# Patient Record
Sex: FEMALE | Race: WHITE | NOT HISPANIC OR LATINO | Employment: FULL TIME | URBAN - METROPOLITAN AREA
[De-identification: names, ages, dates, MRNs, and addresses within clinical notes are randomized per-mention and may not be internally consistent; named-entity substitution may affect disease eponyms.]

---

## 2017-01-06 DIAGNOSIS — G47.00 INSOMNIA, UNSPECIFIED TYPE: Primary | ICD-10-CM

## 2017-01-06 NOTE — TELEPHONE ENCOUNTER
Temazepam      Last Written Prescription Date:  8/23/16  Last Fill Quantity: 30,   # refills: 3  [place] [facility] [place]  [facility] prescribing provider: 10/26/16  Future Office visit:       Routing refill request to provider for review/approval because:  Drug not on the [facility] or  [employer] refill protocol or controlled substance    Ann Jacobson CMA ([facility])

## 2017-01-10 RX ORDER — TEMAZEPAM 15 MG/1
CAPSULE ORAL
Qty: 30 CAPSULE | Refills: 3 | Status: SHIPPED | OUTPATIENT
Start: 2017-01-10 | End: 2017-06-05

## 2017-01-23 DIAGNOSIS — F33.0 MILD RECURRENT MAJOR DEPRESSION (H): Primary | ICD-10-CM

## 2017-01-23 NOTE — TELEPHONE ENCOUNTER
Fluoxetine 20 MG = Fax request from [facility],  Fluoxetine 40 mg is listed on med list   Last Written Prescription Date: 10/27/2016  Last Fill Quantity: 90, # refills: 3  Last Office Visit with [facility] [place] care provider:  10/26/2016        Last PHQ-9 score on record=   PHQ-9 SCORE 10/26/2016   Total Score -   Total Score MyChart 14 (Moderate depression)   Total Score 17

## 2017-01-24 NOTE — TELEPHONE ENCOUNTER
Routing refill request to provider for review/approval because:  Patient needs to be seen because:  It has been > 6 months since last DEPRESSION follow up.  Please advise plan  PHQ 9 >4  Lianet Sommers RN

## 2017-01-25 RX ORDER — FLUOXETINE 40 MG/1
40 CAPSULE ORAL DAILY
Qty: 90 CAPSULE | Refills: 0 | Status: SHIPPED | OUTPATIENT
Start: 2017-01-25 | End: 2017-04-28

## 2017-04-28 DIAGNOSIS — F33.0 MILD RECURRENT MAJOR DEPRESSION (H): ICD-10-CM

## 2017-04-28 NOTE — TELEPHONE ENCOUNTER
prozac     Last Written Prescription Date: 01/25/17  Last Fill Quantity: 90, # refills: 0  Last Office Visit with [facility] primary care provider:  10/26/16        Last PHQ-9 score on record=   PHQ-9 SCORE 10/26/2016   Total Score -   Total Score MyChart 14 (Moderate depression)   Total Score 17

## 2017-05-01 RX ORDER — FLUOXETINE 40 MG/1
CAPSULE ORAL
Qty: 90 CAPSULE | Refills: 2 | Status: SHIPPED | OUTPATIENT
Start: 2017-05-01 | End: 2017-08-15 | Stop reason: DRUGHIGH

## 2017-05-01 NOTE — TELEPHONE ENCOUNTER
Routing refill request to provider for review/approval because:  Has been > 6 months since provider has last addressed depression.     Marlyn Galdamez, RN, BSN

## 2017-05-08 DIAGNOSIS — F41.9 ANXIETY: ICD-10-CM

## 2017-05-08 RX ORDER — LORAZEPAM 0.5 MG/1
TABLET ORAL
Qty: 20 TABLET | Refills: 3 | OUTPATIENT
Start: 2017-05-08

## 2017-05-08 NOTE — TELEPHONE ENCOUNTER
LORazepam (ATIVAN) 0.5 MG tablet      Last Written Prescription Date:  10/26/16  Last Fill Quantity: 20,   # refills: 3  Last Office Visit with [facility], [facility] or [facility] prescribing provider: 10/26/16 ABBIE  Future Office visit:       Routing refill request to provider for review/approval because:  Drug not on the [facility], [facility] or [facility] refill protocol or controlled substance

## 2017-06-05 DIAGNOSIS — G47.00 INSOMNIA, UNSPECIFIED TYPE: ICD-10-CM

## 2017-06-05 RX ORDER — TEMAZEPAM 15 MG/1
CAPSULE ORAL
Qty: 30 CAPSULE | Refills: 3 | Status: SHIPPED | OUTPATIENT
Start: 2017-06-05 | End: 2017-11-02

## 2017-06-05 NOTE — TELEPHONE ENCOUNTER
temazepam (RESTORIL) 15 MG capsule      Last Written Prescription Date:  1/10/17  Last Fill Quantity: 30,   # refills: 3  Last Office Visit with [facility], [facility] or [facility] prescribing provider: 10/26/16 alis  Future Office visit:       Routing refill request to provider for review/approval because:  Drug not on the [facility], [facility] or [facility] refill protocol or controlled substance

## 2017-06-06 NOTE — TELEPHONE ENCOUNTER
[facility]   Closing encounter  Ammy Diaz RT (R)

## 2017-08-14 NOTE — PROGRESS NOTES
SUBJECTIVE:                                                    Deo Hargrove is a 41 year old female who presents to clinic today for the following health issues:    History of Present Illness   Depression & Anxiety Follow-up:     Depression/Anxiety:  Anxiety only    Status since last visit::  Worsened    Other associated symptoms of anxiety::  None    Significant life event::  No    Current substance use::  None    Depression symptoms::  None  Diet::  Regular (no restrictions)  Frequency of exercise::  2-3 days/week  Duration of exercise::  15-30 minutes  Taking medications regularly::  No  Barriers to taking medications::  Not applicable  Medication side effects::  None  Additional concerns today::  No      Yesterday she had a panic attack. She has not had one in a while. She has noticed her anxiety has increased overall.  She had been doing very well with her fluoxetine for a number of years. 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  She is not interested in starting more medications in fact she would like to take less meds.    Problem list and histories reviewed & adjusted, as indicated.  Additional history: as documented        BP Readings from Last 3 Encounters:   08/15/17 90/66   10/26/16 108/74   05/26/16 130/84    Wt Readings from Last 3 Encounters:   08/15/17 161 lb 3.2 oz (73.1 kg)   10/26/16 159 lb 3.2 oz (72.2 kg)   02/29/16 159 lb 11.2 oz (72.4 kg)                  Labs reviewed in EPIC      ROS:  She has episodes or she feels very tired and fatigued. They typically last about 2 days. Her joints get very sore and it's hard for her to move because of this. Her neurologist thinks it is related to her migraines. She also has trigeminal neuralgia. Her neurologist manages both issues    OBJECTIVE:     BP 90/66  Pulse 74  Temp 97.8  F (36.6  C) (Temporal)  Resp 16  Ht 5'  "3.39\" (1.61 m)  Wt 161 lb 3.2 oz (73.1 kg)  Breastfeeding? No  BMI 28.21 kg/m2  Body mass index is 28.21 kg/(m^2).  GENERAL: healthy, alert and no distress  PSYCH: mentation appears normal, affect normal/bright    Diagnostic Test Results:  none     ASSESSMENT/PLAN:             1. Generalized anxiety disorder  Increased dosage of fluoxetine to max dosage of 60 mg    2. Mild recurrent major depression (H)  Well-controlled on current med    3. Anxiety  As above our hope is that this will improve her anxiety controlled overall. She was still using lorazepam as needed.  Other things out for today are below , Atarax, or switching her fluoxetine. We will continue all of these things if she fails to improve with higher dosage  - FLUoxetine (PROZAC) 20 MG capsule; Take 3 capsules (60 mg) by mouth daily  Dispense: 90 capsule; Refill: 5  - LORazepam (ATIVAN) 0.5 MG tablet; TAKE 1 TABLET BY MOUTH EVERY DAY AS NEEDED  Dispense: 20 tablet; Refill: 3    4. CARDIOVASCULAR SCREENING; LDL GOAL LESS THAN 160    - Lipid panel reflex to direct LDL    5. Screening for diabetes mellitus    - Glucose    Recheck 6 months if doing well phone visit is fine Otherwise recheck 1 month phone visit    Precious Locke PA-C  [facility]  Answers for HPI/ROS submitted by the patient on 8/15/2017   If you checked off any problems, how difficult have these problems made it for you to do your work, take care of things at home, or get along with other people?: Somewhat difficult  PHQ9 TOTAL SCORE: 7  BONNIE 7 TOTAL SCORE: 11Electronically signed by Precious Locke PA-C   "

## 2017-08-15 ENCOUNTER — OFFICE VISIT (OUTPATIENT)
Dept: FAMILY MEDICINE | Facility: OTHER | Age: 42
End: 2017-08-15
Payer: COMMERCIAL

## 2017-08-15 VITALS
HEIGHT: 63 IN | RESPIRATION RATE: 16 BRPM | SYSTOLIC BLOOD PRESSURE: 90 MMHG | TEMPERATURE: 97.8 F | HEART RATE: 74 BPM | BODY MASS INDEX: 28.56 KG/M2 | DIASTOLIC BLOOD PRESSURE: 66 MMHG | WEIGHT: 161.2 LBS

## 2017-08-15 DIAGNOSIS — F33.0 MILD RECURRENT MAJOR DEPRESSION (H): ICD-10-CM

## 2017-08-15 DIAGNOSIS — F41.9 ANXIETY: ICD-10-CM

## 2017-08-15 DIAGNOSIS — F41.1 GENERALIZED ANXIETY DISORDER: ICD-10-CM

## 2017-08-15 DIAGNOSIS — Z13.1 SCREENING FOR DIABETES MELLITUS: ICD-10-CM

## 2017-08-15 DIAGNOSIS — Z13.6 CARDIOVASCULAR SCREENING; LDL GOAL LESS THAN 160: Primary | ICD-10-CM

## 2017-08-15 LAB
CHOLEST SERPL-MCNC: 205 MG/DL
GLUCOSE SERPL-MCNC: 90 MG/DL (ref 70–99)
HDLC SERPL-MCNC: 60 MG/DL
LDLC SERPL CALC-MCNC: 129 MG/DL
NONHDLC SERPL-MCNC: 145 MG/DL
TRIGL SERPL-MCNC: 82 MG/DL

## 2017-08-15 PROCEDURE — 99214 OFFICE O/P EST MOD 30 MIN: CPT | Performed by: PHYSICIAN ASSISTANT

## 2017-08-15 PROCEDURE — 82947 ASSAY GLUCOSE BLOOD QUANT: CPT | Performed by: PHYSICIAN ASSISTANT

## 2017-08-15 PROCEDURE — 80061 LIPID PANEL: CPT | Performed by: PHYSICIAN ASSISTANT

## 2017-08-15 PROCEDURE — 36415 COLL VENOUS BLD VENIPUNCTURE: CPT | Performed by: PHYSICIAN ASSISTANT

## 2017-08-15 RX ORDER — LORAZEPAM 0.5 MG/1
TABLET ORAL
Qty: 20 TABLET | Refills: 3 | Status: SHIPPED | OUTPATIENT
Start: 2017-08-15 | End: 2018-02-27

## 2017-08-15 RX ORDER — TRAMADOL HYDROCHLORIDE 50 MG/1
TABLET ORAL
Refills: 1 | COMMUNITY
Start: 2017-07-20

## 2017-08-15 ASSESSMENT — PATIENT HEALTH QUESTIONNAIRE - PHQ9
10. IF YOU CHECKED OFF ANY PROBLEMS, HOW DIFFICULT HAVE THESE PROBLEMS MADE IT FOR YOU TO DO YOUR WORK, TAKE CARE OF THINGS AT HOME, OR GET ALONG WITH OTHER PEOPLE: SOMEWHAT DIFFICULT
SUM OF ALL RESPONSES TO PHQ QUESTIONS 1-9: 7
SUM OF ALL RESPONSES TO PHQ QUESTIONS 1-9: 7

## 2017-08-15 ASSESSMENT — ANXIETY QUESTIONNAIRES
GAD7 TOTAL SCORE: 11
GAD7 TOTAL SCORE: 11
4. TROUBLE RELAXING: MORE THAN HALF THE DAYS
2. NOT BEING ABLE TO STOP OR CONTROL WORRYING: SEVERAL DAYS
3. WORRYING TOO MUCH ABOUT DIFFERENT THINGS: MORE THAN HALF THE DAYS
6. BECOMING EASILY ANNOYED OR IRRITABLE: MORE THAN HALF THE DAYS
5. BEING SO RESTLESS THAT IT IS HARD TO SIT STILL: MORE THAN HALF THE DAYS
GAD7 TOTAL SCORE: 11
1. FEELING NERVOUS, ANXIOUS, OR ON EDGE: SEVERAL DAYS
7. FEELING AFRAID AS IF SOMETHING AWFUL MIGHT HAPPEN: SEVERAL DAYS
7. FEELING AFRAID AS IF SOMETHING AWFUL MIGHT HAPPEN: SEVERAL DAYS

## 2017-08-15 ASSESSMENT — PAIN SCALES - GENERAL: PAINLEVEL: NO PAIN (0)

## 2017-08-15 NOTE — MR AVS SNAPSHOT
After Visit Summary   8/15/2017    Deo Hargrove    MRN: [id number]           Patient Information     Date Of Birth          1975        Visit Information        Provider Department      8/15/2017 8:30 AM Precious Locke PA-C [facility]        Today's Diagnoses     CARDIOVASCULAR SCREENING; LDL GOAL LESS THAN 160    -  1    Generalized anxiety disorder        Mild recurrent major depression (H)        Anxiety        Screening for diabetes mellitus           Follow-ups after your visit        Who to contact     If you have questions or need follow up information about today's clinic visit or your schedule please contact [facility] directly at [phone].  Normal or non-critical lab and imaging results will be communicated to you by [employer], letter or phone within 4 business days after the clinic has received the results. If you do not hear from us within 7 days, please contact the clinic through [employer] or phone. If you have a critical or abnormal lab result, we will notify you by phone as soon as possible.  Submit refill requests through [employer] or call your pharmacy and they will forward the refill request to us. Please allow 3 business days for your refill to be completed.          Additional Information About Your Visit        MyChart Information     [employer] gives you secure access to your electronic health record. If you see a primary care provider, you can also send messages to your care team and make appointments. If you have questions, please call your primary care clinic.  If you do not have a primary care provider, please call [phone] and they will assist you.        Care EveryWhere ID     This is your Care EveryWhere ID. This could be used by other organizations to access your [place] medical records  [id number]        Your Vitals Were     Pulse Temperature Respirations Height Breastfeeding? BMI (Body Mass Index)    74 97.8  F (36.6  C)  "(Temporal) 16 5' 3.39\" (1.61 m) No 28.21 kg/m2       Blood Pressure from Last 3 Encounters:   08/15/17 90/66   10/26/16 108/74   05/26/16 130/84    Weight from Last 3 Encounters:   08/15/17 161 lb 3.2 oz (73.1 kg)   10/26/16 159 lb 3.2 oz (72.2 kg)   02/29/16 159 lb 11.2 oz (72.4 kg)              We Performed the Following     DEPRESSION ACTION PLAN (DAP)     Glucose     Lipid panel reflex to direct LDL     Lipid panel reflex to direct LDL          Today's Medication Changes          These changes are accurate as of: 8/15/17  8:55 AM.  If you have any questions, ask your nurse or doctor.               These medicines have changed or have updated prescriptions.        Dose/Directions    FLUoxetine 20 MG capsule   Commonly known as:  PROzac   This may have changed:  See the new instructions.   Used for:  Anxiety   Changed by:  Precious Locke PA-C        Dose:  60 mg   Take 3 capsules (60 mg) by mouth daily   Quantity:  90 capsule   Refills:  5            Where to get your medicines      [facility] [address] [place] [id number] [facility] [place] [facility] [address]  [address] [facility] [address]     Phone:  [phone]     FLUoxetine 20 MG capsule         Some of these will need a paper prescription and others can be bought over the counter.  Ask your nurse if you have questions.     Bring a paper prescription for each of these medications     LORazepam 0.5 MG tablet                Primary Care Provider    None       No address on file        Equal Access to Services     EDMUNDO CABRERA AH: Hadii hoang millero Socatrachoali, waaxda luqadaha, qaybta kaalmada adeegyada, olive sosa. So [facility] [place] [facility] [phone].    ATENCIÓN: Si habla español, tiene a castañeda disposición servicios gratuitos de asistencia lingüística. Llame al [phone].    We comply with applicable federal civil rights laws and [place] laws. We do not discriminate on the basis of race, " color, national origin, age, disability sex, sexual orientation or gender identity.            Thank you!     Thank you for choosing [facility]  for your care. Our goal is always to provide you with excellent care. Hearing back from our patients is one way we can continue to improve our services. Please take a few minutes to complete the written survey that you may receive in the mail after your visit with us. Thank you!             Your Updated Medication List - Protect others around you: Learn how to safely use, store and throw away your medicines at www.disposemymeds.org.          This list is accurate as of: 8/15/17  8:55 AM.  Always use your most recent med list.                   Brand Name Dispense Instructions for use Diagnosis    * carBAMazepine 50 mg chewable half-tab    TEGretol     Take 100 mg by mouth 2 times daily        * carBAMazepine 200 MG 12 hr capsule    CARBATROL     Take 200 mg by mouth 2 times daily        FLUoxetine 20 MG capsule    PROzac    90 capsule    Take 3 capsules (60 mg) by mouth daily    Anxiety       LORazepam 0.5 MG tablet    ATIVAN    20 tablet    TAKE 1 TABLET BY MOUTH EVERY DAY AS NEEDED    Anxiety       temazepam 15 MG capsule    RESTORIL    30 capsule    TAKE ONE CAPSULE BY MOUTH NIGHTLY AS NEEDED    Insomnia, unspecified type       topiramate 100 MG tablet    TOPAMAX    60 tablet    Take 1 tablet (100 mg) by mouth 2 times daily Prescribed by Neurologist        traMADol 50 MG tablet    ULTRAM     TK 1 T PO  AT THE ONSET OF MIGRAINE. CAN REPEAT ONCE AFTER 2 HOURS IF NEEDED        * Notice:  This list has 2 medication(s) that are the same as other medications prescribed for you. Read the directions carefully, and ask your doctor or other care provider to review them with you.

## 2017-08-15 NOTE — NURSING NOTE
"Chief Complaint   Patient presents with     Anxiety     Panel Management     Lipids, dap, phq, cele       Initial BP 90/66  Pulse 74  Temp 97.8  F (36.6  C) (Temporal)  Resp 16  Ht 5' 3.39\" (1.61 m)  Wt 161 lb 3.2 oz (73.1 kg)  Breastfeeding? No  BMI 28.21 kg/m2 Estimated body mass index is 28.21 kg/(m^2) as calculated from the following:    Height as of this encounter: 5' 3.39\" (1.61 m).    Weight as of this encounter: 161 lb 3.2 oz (73.1 kg).  Medication Reconciliation: complete    "

## 2017-08-15 NOTE — LETTER
My Depression Action Plan  Name: Deo aHrgrove   Date of Birth 1975  Date: 8/14/2017    My doctor: None   My clinic: [facility]  [address] [facility] [address] [phone]          GREEN    ZONE   Good Control    What it looks like:     Things are going generally well. You have normal up s and down s. You may even feel depressed from time to time, but bad moods usually last less than a day.   What you need to do:  1. Continue to care for yourself (see self care plan)  2. Check your depression survival kit and update it as needed  3. Follow your physician s recommendations including any medication.  4. Do not stop taking medication unless you consult with your physician first.           YELLOW         ZONE Getting Worse    What it looks like:     Depression is starting to interfere with your life.     It may be hard to get out of bed; you may be starting to isolate yourself from others.    Symptoms of depression are starting to last most all day and this has happened for several days.     You may have suicidal thoughts but they are not constant.   What you need to do:     1. Call your care team, your response to treatment will improve if you keep your care team informed of your progress. Yellow periods are signs an adjustment may need to be made.     2. Continue your self-care, even if you have to fake it!    3. Talk to someone in your support network    4. Open up your depression survival kit           RED    ZONE Medical Alert - Get Help    What it looks like:     Depression is seriously interfering with your life.     You may experience these or other symptoms: You can t get out of bed most days, can t work or engage in other necessary activities, you have trouble taking care of basic hygiene, or basic responsibilities, thoughts of suicide or death that will not go away, self-injurious behavior.     What you need to do:  1. Call your care team and request a  same-day appointment. If they are not available (weekends or after hours) call your local crisis line, emergency room or 911.      Electronically signed by: Alessia Burnett, August 14, 2017    Depression Self Care Plan / Survival Kit    Self-Care for Depression  Here s the deal. Your body and mind are really not as separate as most people think.  What you do and think affects how you feel and how you feel influences what you do and think. This means if you do things that people who feel good do, it will help you feel better.  Sometimes this is all it takes.  There is also a place for medication and therapy depending on how severe your depression is, so be sure to consult with your medical provider and/ or Behavioral Health Consultant if your symptoms are worsening or not improving.     In order to better manage my stress, I will:    Exercise  Get some form of exercise, every day. This will help reduce pain and release endorphins, the  feel good  chemicals in your brain. This is almost as good as taking antidepressants!  This is not the same as joining a gym and then never going! (they count on that by the way ) It can be as simple as just going for a walk or doing some gardening, anything that will get you moving.      Hygiene   Maintain good hygiene (Get out of bed in the morning, Make your bed, Brush your teeth, Take a shower, and Get dressed like you were going to work, even if you are unemployed).  If your clothes don't fit try to get ones that do.    Diet  I will strive to eat foods that are good for me, drink plenty of water, and avoid excessive sugar, caffeine, alcohol, and other mood-altering substances.  Some foods that are helpful in depression are: complex carbohydrates, B vitamins, flaxseed, fish or fish oil, fresh fruits and vegetables.    Psychotherapy  I agree to participate in Individual Therapy (if recommended).    Medication  If prescribed medications, I agree to take them.  Missing doses  can result in serious side effects.  I understand that drinking alcohol, or other illicit drug use, may cause potential side effects.  I will not stop my medication abruptly without first discussing it with my provider.    Staying Connected With Others  I will stay in touch with my friends, family members, and my primary care provider/team.    Use your imagination  Be creative.  We all have a creative side; it doesn t matter if it s oil painting, sand castles, or mud pies! This will also kick up the endorphins.    Witness Beauty  (AKA stop and smell the roses) Take a look outside, even in mid-winter. Notice colors, textures. Watch the squirrels and birds.     Service to others  Be of service to others.  There is always someone else in need.  By helping others we can  get out of ourselves  and remember the really important things.  This also provides opportunities for practicing all the other parts of the program.    Humor  Laugh and be silly!  Adjust your TV habits for less news and crime-drama and more comedy.    Control your stress  Try breathing deep, massage therapy, biofeedback, and meditation. Find time to relax each day.     My support system    Clinic Contact:  Phone number:    Contact 1:  Phone number:    Contact 2:  Phone number:    Protestant/:  Phone number:    Therapist:  Phone number:    Local crisis center:    Phone number:    Other community support:  Phone number:

## 2017-08-16 ASSESSMENT — ANXIETY QUESTIONNAIRES: GAD7 TOTAL SCORE: 11

## 2017-08-16 ASSESSMENT — PATIENT HEALTH QUESTIONNAIRE - PHQ9: SUM OF ALL RESPONSES TO PHQ QUESTIONS 1-9: 7

## 2017-09-25 ENCOUNTER — TRANSFERRED RECORDS (OUTPATIENT)
Dept: HEALTH INFORMATION MANAGEMENT | Facility: CLINIC | Age: 42
End: 2017-09-25

## 2017-10-10 ENCOUNTER — MYC MEDICAL ADVICE (OUTPATIENT)
Dept: FAMILY MEDICINE | Facility: OTHER | Age: 42
End: 2017-10-10

## 2017-10-15 ENCOUNTER — E-VISIT (OUTPATIENT)
Dept: FAMILY MEDICINE | Facility: OTHER | Age: 42
End: 2017-10-15
Payer: COMMERCIAL

## 2017-10-15 DIAGNOSIS — F41.9 ANXIETY: ICD-10-CM

## 2017-10-15 DIAGNOSIS — F41.1 GENERALIZED ANXIETY DISORDER: Primary | ICD-10-CM

## 2017-10-15 PROCEDURE — 98969 ZZC NONPHYSICIAN ONLINE ASSESSMENT AND MANAGEMENT: CPT | Performed by: PHYSICIAN ASSISTANT

## 2017-10-18 RX ORDER — BUSPIRONE HYDROCHLORIDE 5 MG/1
TABLET ORAL
Qty: 150 TABLET | Refills: 0 | Status: SHIPPED | OUTPATIENT
Start: 2017-10-18 | End: 2018-04-27

## 2017-10-20 ENCOUNTER — MYC MEDICAL ADVICE (OUTPATIENT)
Dept: FAMILY MEDICINE | Facility: OTHER | Age: 42
End: 2017-10-20

## 2017-10-20 NOTE — TELEPHONE ENCOUNTER
[facility] My [facility] have not received Rx for Buspar, please advise   Thanks  Ammy Diaz RT (R)

## 2017-11-02 DIAGNOSIS — G47.00 INSOMNIA, UNSPECIFIED TYPE: ICD-10-CM

## 2017-11-03 ENCOUNTER — OFFICE VISIT (OUTPATIENT)
Dept: FAMILY MEDICINE | Facility: OTHER | Age: 42
End: 2017-11-03
Payer: COMMERCIAL

## 2017-11-03 ENCOUNTER — RADIANT APPOINTMENT (OUTPATIENT)
Dept: GENERAL RADIOLOGY | Facility: OTHER | Age: 42
End: 2017-11-03
Attending: STUDENT IN AN ORGANIZED HEALTH CARE EDUCATION/TRAINING PROGRAM
Payer: COMMERCIAL

## 2017-11-03 VITALS
HEART RATE: 68 BPM | HEIGHT: 63 IN | DIASTOLIC BLOOD PRESSURE: 74 MMHG | SYSTOLIC BLOOD PRESSURE: 108 MMHG | TEMPERATURE: 98 F | RESPIRATION RATE: 12 BRPM

## 2017-11-03 DIAGNOSIS — M72.2 PLANTAR FASCIITIS: ICD-10-CM

## 2017-11-03 DIAGNOSIS — M79.671 CHRONIC HEEL PAIN, RIGHT: Primary | ICD-10-CM

## 2017-11-03 DIAGNOSIS — G89.29 CHRONIC HEEL PAIN, RIGHT: ICD-10-CM

## 2017-11-03 DIAGNOSIS — M79.671 CHRONIC HEEL PAIN, RIGHT: ICD-10-CM

## 2017-11-03 DIAGNOSIS — G89.29 CHRONIC HEEL PAIN, RIGHT: Primary | ICD-10-CM

## 2017-11-03 PROCEDURE — 99213 OFFICE O/P EST LOW 20 MIN: CPT | Performed by: STUDENT IN AN ORGANIZED HEALTH CARE EDUCATION/TRAINING PROGRAM

## 2017-11-03 PROCEDURE — 73650 X-RAY EXAM OF HEEL: CPT | Mod: RT

## 2017-11-03 ASSESSMENT — PATIENT HEALTH QUESTIONNAIRE - PHQ9
SUM OF ALL RESPONSES TO PHQ QUESTIONS 1-9: 7
10. IF YOU CHECKED OFF ANY PROBLEMS, HOW DIFFICULT HAVE THESE PROBLEMS MADE IT FOR YOU TO DO YOUR WORK, TAKE CARE OF THINGS AT HOME, OR GET ALONG WITH OTHER PEOPLE: SOMEWHAT DIFFICULT
SUM OF ALL RESPONSES TO PHQ QUESTIONS 1-9: 7

## 2017-11-03 ASSESSMENT — PAIN SCALES - GENERAL: PAINLEVEL: MILD PAIN (3)

## 2017-11-03 NOTE — TELEPHONE ENCOUNTER
Routing refill request to provider for review/approval because:  A break in medication.  Rx was sent to pharmacy on 6/5/17 for a one month supply with 3 refills.  Pt should have been out at the beginning of October.    Nelsy Weiss RN

## 2017-11-03 NOTE — MR AVS SNAPSHOT
After Visit Summary   11/3/2017    Deo Hargrove    MRN: [id number]           Patient Information     Date Of Birth          1975        Visit Information        Provider Department      11/3/2017 2:40 PM Rosalinda Ho APRN [facility]        Today's Diagnoses     Chronic heel pain, right    -  1      Care Instructions      Night splint - plantar fasciitis   Ibuprofen - 800 three times daily for 10-14 days  Rosalinda Ho, NP-C    Understanding Plantar Fasciitis    Plantar fasciitis is a condition that causes foot and heel pain. The plantar fascia is a tough band of tissue that runs across the bottom of the foot from the heel to the toes. This tissue pulls on the heel bone. It supports the arch of the foot as it pushes off the ground. If the tissue becomes irritated or red and swollen (inflamed), it is called plantar fasciitis.  How to say it  PLAN-tuhr fa-see-IY-tis   What causes plantar fasciitis?  Plantar fasciitis most often occurs from overusing the plantar fascia. The tissue may become damaged from activities that put repeated stress on the heel and foot. Or it may wear down over time with age and ankle stiffness. You are more likely to have plantar fasciitis if you:    Do activities that require a lot of running, jumping, or dancing    Have a job that requires being on your feet for long periods    Are overweight or obese    Have certain foot problems, such as a tight Achilles tendon, flat feet, or high arches    Often wear poorly fitting shoes  Symptoms of plantar fasciitis  The condition most often causes pain in the heel and the bottom of the foot. The pain may occur when you take your first steps in the morning. It may get better as you walk throughout the day. But as you continue to put weight on the foot, the pain often returns. Pain may also occur after standing or sitting for long periods.  Treating plantar fasciitis  Treatments for plantar  fasciitis include:    Resting the foot. This involves limiting movements that make your foot hurt. You may also need to avoid certain sports and types of work for a time.    Using cold packs. Put an ice pack on the heel and foot to help reduce pain and swelling.    Taking pain medicines. Prescription and over-the-counter pain medicines can help relieve pain and swelling.    Using heel cups or foot inserts (orthotics). These are placed in the shoes to help support the heel or arch and cushion the heel. You may also be told to buy proper-fitting shoes with good arch support and cushioned soles.    Taping the foot. This supports the arch and limits the movement of the plantar fascia to help relieve symptoms.    Wearing a night splint. This stretches the plantar fascia and leg muscles while you sleep. This may help relieve pain.    Doing exercises and physical therapy. These stretch and strengthen the plantar fascia and the muscles in the leg that support the heel and foot.    Getting shots of medicine into the foot. These may help relieve symptoms for a time.    Having surgery. This may be needed if other treatments fail to relieve symptoms. During surgery, the surgeon may partially cut the plantar fascia to release tension.  Possible complications of plantar fasciitis  Without proper care and treatment, healing may take longer than normal. Also, symptoms may continue or get worse. Over time, the plantar fascia may be damaged. This can make it hard to walk or even stand without pain.  When to call your healthcare provider  Call your healthcare provider right away if you have any of these:    Fever of 100.4 F (38 C) or higher, or as directed    Symptoms that don t get better with treatment, or get worse    New symptoms, such as numbness, tingling, or weakness in the foot   Date Last Reviewed: 3/10/2016    8841-3704 [employer]. [address] [place] [address], [place] [address]. All rights reserved. This  information is not intended as a substitute for professional medical care. Always follow your healthcare professional's instructions.    Reliable shoe stores: To maximize your experience and provide the best possible fit.  Be sure to show them your foot concerns and tell them Dr. Simon sent you.        Stores listed in bold have only athletic shoes, and stores that are not bold are mostly casual or variety of shoes    [employer]  [address] [place] [address]  [phone]     [employer]  [address] [place] [address]  [phone]     [employer] [place]  [address] [place] [address]  [phone]    [employer]  [address] [facility] [address]  [phone]    [employer]  [address] [place] [address]  [phone]    [employer]  [address] [place] [address]  [phone]                         [employer] Locations:     [address] [place] [address]   [phone]     [address] [place] [address] [address] [place] [address]   [phone]     [address] [place] [address]   [phone]     [address] [facility] [place] [address]   [phone]     [address] [facility] [place] [address]   [phone]     [address] [facility] [place] [address]   [phone]     [address] [facility] [address]   [place] [address]   [phone]     [address] [facility]. [address]   [place], [place] [id number] [phone]             How to find reasonable shoes          The correct width    Correct Fitting    Correct Length      Foot Distortion    Posture Distortion                          Torsional Rigidity      Grasp behind the heel and underneath the foot and twist      Bad    Excessive torsion/twist in midfoot     Less torsion/twist in midfoot is better                   Heel Counter Rigidity      Grasp just above   midsole and squeeze      Bad    Soft heel counter      Good    Rigid Heel  Counter      Flexion Rigidity      Grasp shoe and bend from forefoot to rearfoot                          Follow-ups after your visit        Additional Services     ORTHO  REFERRAL       [facility] is referring you to the Orthopedic  Services at [facility].       The  Representative will assist you in the coordination of your Orthopedic and Musculoskeletal Care as prescribed by your physician.    The  Representative will call you within 1 business day to help schedule your appointment, or you may contact the  Representative at:    All areas ~ [phone]     Type of Referral : [facility] / [facility]       Timeframe requested: 3 - 5 days    Coverage of these services is subject to the terms and limitations of your health insurance plan.  Please call member services at your health plan with any benefit or coverage questions.      If X-rays, CT or MRI's have been performed, please contact the facility where they were done to arrange for , prior to your scheduled appointment.  Please bring this referral request to your appointment and present it to your specialist.                  Who to contact     If you have questions or need follow up information about today's clinic visit or your schedule please contact [facility] directly at [phone].  Normal or non-critical lab and imaging results will be communicated to you by MyChart, letter or phone within 4 business days after the clinic has received the results. If you do not hear from us within 7 days, please contact the clinic through MyChart or phone. If you have a critical or abnormal lab result, we will notify you by phone as soon as possible.  Submit refill requests through [email or web address] or call your pharmacy and they will forward the refill request to us. Please allow 3 business days for your refill to be completed.          Additional  "Information About Your Visit        MyChart Information     [employer] gives you secure access to your electronic health record. If you see a primary care provider, you can also send messages to your care team and make appointments. If you have questions, please call your primary care clinic.  If you do not have a primary care provider, please call [phone] and they will assist you.        Care EveryWhere ID     This is your Care EveryWhere ID. This could be used by other organizations to access your [facility] medical records  [id number]        Your Vitals Were     Pulse Temperature Respirations Height          68 98  F (36.7  C) (Temporal) 12 5' 3.39\" (1.61 m)         Blood Pressure from Last 3 Encounters:   11/03/17 108/74   08/15/17 90/66   10/26/16 108/74    Weight from Last 3 Encounters:   08/15/17 161 lb 3.2 oz (73.1 kg)   10/26/16 159 lb 3.2 oz (72.2 kg)   02/29/16 159 lb 11.2 oz (72.4 kg)              We Performed the Following     ORTHO  REFERRAL        Primary Care Provider Office Phone # Fax #    Rosalinda Jacy Ho, LARRY [facility] [phone]       [address] [address]        Equal Access to Services     EDMUNDO CABRERA : Hadii aad ku hadasho Soomaali, waaxda luqadaha, qaybta kaalmada adeegyada, waxay idiin hayaan troy cooper . So [facility] [phone].    ATENCIÓN: Si habla español, tiene a castañeda disposición servicios gratLiquid Lightos de asistencia lingüística. [facility] [phone].    We comply with applicable federal civil rights laws and [place] laws. We do not discriminate on the basis of race, color, national origin, age, disability, sex, sexual orientation, or gender identity.            Thank you!     Thank you for choosing [facility]  for your care. Our goal is always to provide you with excellent care. Hearing back from our patients is one way we can continue to improve our services. Please take a few minutes to complete the written " survey that you may receive in the mail after your visit with us. Thank you!             Your Updated Medication List - Protect others around you: Learn how to safely use, store and throw away your medicines at www.disposemymeds.org.          This list is accurate as of: 11/3/17  3:34 PM.  Always use your most recent med list.                   Brand Name Dispense Instructions for use Diagnosis    busPIRone 5 MG tablet    BUSPAR    150 tablet    Start at 5 mg twice daily for 3 days, then 7.5 mg (1.5 tabs) twice daily for 3 days, then 10 mg (2 tabs) twice daily for 3 days, then 12.5 mg (2.5 tabs) twice daily for 3 days, then 15 mg (3 tabs) twice daily and stay at that dose    Generalized anxiety disorder       * carBAMazepine 50 mg chewable half-tab    TEGretol     Take 100 mg by mouth 2 times daily        * carBAMazepine 200 MG 12 hr capsule    CARBATROL     Take 200 mg by mouth 2 times daily        FLUoxetine 20 MG capsule    PROzac    60 capsule    Take 2 capsules (40 mg) by mouth daily    Anxiety       LORazepam 0.5 MG tablet    ATIVAN    20 tablet    TAKE 1 TABLET BY MOUTH EVERY DAY AS NEEDED    Anxiety       temazepam 15 MG capsule    RESTORIL    30 capsule    TAKE ONE CAPSULE BY MOUTH NIGHTLY AS NEEDED    Insomnia, unspecified type       topiramate 100 MG tablet    TOPAMAX    60 tablet    Take 1 tablet (100 mg) by mouth 2 times daily Prescribed by Neurologist        traMADol 50 MG tablet    ULTRAM     TK 1 T PO  AT THE ONSET OF MIGRAINE. CAN REPEAT ONCE AFTER 2 HOURS IF NEEDED        * Notice:  This list has 2 medication(s) that are the same as other medications prescribed for you. Read the directions carefully, and ask your doctor or other care provider to review them with you.

## 2017-11-03 NOTE — PATIENT INSTRUCTIONS
Night splint - plantar fasciitis   Ibuprofen - 800 three times daily for 10-14 days  STEPHANIE Salamanca    Understanding Plantar Fasciitis    Plantar fasciitis is a condition that causes foot and heel pain. The plantar fascia is a tough band of tissue that runs across the bottom of the foot from the heel to the toes. This tissue pulls on the heel bone. It supports the arch of the foot as it pushes off the ground. If the tissue becomes irritated or red and swollen (inflamed), it is called plantar fasciitis.  How to say it  PLAN-tuhr fa-see-IY-tis   What causes plantar fasciitis?  Plantar fasciitis most often occurs from overusing the plantar fascia. The tissue may become damaged from activities that put repeated stress on the heel and foot. Or it may wear down over time with age and ankle stiffness. You are more likely to have plantar fasciitis if you:    Do activities that require a lot of running, jumping, or dancing    Have a job that requires being on your feet for long periods    Are overweight or obese    Have certain foot problems, such as a tight Achilles tendon, flat feet, or high arches    Often wear poorly fitting shoes  Symptoms of plantar fasciitis  The condition most often causes pain in the heel and the bottom of the foot. The pain may occur when you take your first steps in the morning. It may get better as you walk throughout the day. But as you continue to put weight on the foot, the pain often returns. Pain may also occur after standing or sitting for long periods.  Treating plantar fasciitis  Treatments for plantar fasciitis include:    Resting the foot. This involves limiting movements that make your foot hurt. You may also need to avoid certain sports and types of work for a time.    Using cold packs. Put an ice pack on the heel and foot to help reduce pain and swelling.    Taking pain medicines. Prescription and over-the-counter pain medicines can help relieve pain and swelling.    Using  heel cups or foot inserts (orthotics). These are placed in the shoes to help support the heel or arch and cushion the heel. You may also be told to buy proper-fitting shoes with good arch support and cushioned soles.    Taping the foot. This supports the arch and limits the movement of the plantar fascia to help relieve symptoms.    Wearing a night splint. This stretches the plantar fascia and leg muscles while you sleep. This may help relieve pain.    Doing exercises and physical therapy. These stretch and strengthen the plantar fascia and the muscles in the leg that support the heel and foot.    Getting shots of medicine into the foot. These may help relieve symptoms for a time.    Having surgery. This may be needed if other treatments fail to relieve symptoms. During surgery, the surgeon may partially cut the plantar fascia to release tension.  Possible complications of plantar fasciitis  Without proper care and treatment, healing may take longer than normal. Also, symptoms may continue or get worse. Over time, the plantar fascia may be damaged. This can make it hard to walk or even stand without pain.  When to call your healthcare provider  Call your healthcare provider right away if you have any of these:    Fever of 100.4 F (38 C) or higher, or as directed    Symptoms that don t get better with treatment, or get worse    New symptoms, such as numbness, tingling, or weakness in the foot   Date Last Reviewed: 3/10/2016    0550-2271 [employer]. [address] [place], [place] [address]. All rights reserved. This information is not intended as a substitute for professional medical care. Always follow your healthcare professional's instructions.    Reliable shoe stores: To maximize your experience and provide the best possible fit.  Be sure to show them your foot concerns and tell them Dr. Simon sent you.        Stores listed in bold have only athletic shoes, and stores that are not bold are mostly  casual or variety of shoes    [employer]  [address] [place] [address]  [phone]    [employer] [place]  [address] [place] [address]  [phone]    [email or web address] [place]  [address] [place] [address]  [phone]    [employer]  [address]  [place] [address]  [phone]    [employer]  [address] [place] [address]  [phone]    [employer]  [address] [place] [address]  [phone]                         [employer] Locations:     [address] [place] [address]   [phone]     [address] [place] [address] [address] [place] [address]   [phone]     [address] [facility] [place] [address]   [phone]     [address] [place] [address]   [phone]     [address] [place] [address]   [phone]     [address] [facility] [place] [address]   [phone]     [address]   [place] [address]   [phone]     [address] [facility]. [address]   [place], [place] [address] [phone]             How to find reasonable shoes          The correct width    Correct Fitting    Correct Length      Foot Distortion    Posture Distortion                          Torsional Rigidity      Grasp behind the heel and underneath the foot and twist      Bad    Excessive torsion/twist in midfoot     Less torsion/twist in midfoot is better                   Heel Counter Rigidity      Grasp just above   midsole and squeeze      Bad    Soft heel counter      Good    Rigid Heel Counter      Flexion Rigidity      Grasp shoe and bend from forefoot to rearfoot

## 2017-11-03 NOTE — NURSING NOTE
"Chief Complaint   Patient presents with     Musculoskeletal Problem     Heel pain     Panel Management       Initial /74 (BP Location: Left arm, Patient Position: Sitting, Cuff Size: Adult Regular)  Pulse 68  Temp 98  F (36.7  C) (Temporal)  Resp 12  Ht 5' 3.39\" (1.61 m) Estimated body mass index is 28.21 kg/(m^2) as calculated from the following:    Height as of 8/15/17: 5' 3.39\" (1.61 m).    Weight as of 8/15/17: 161 lb 3.2 oz (73.1 kg).  Medication Reconciliation: complete   Moan Ackerman CMA      "

## 2017-11-03 NOTE — PROGRESS NOTES
"  SUBJECTIVE:                                                    Deo Hargrove is a 42 year old female who presents to clinic today for the following health issues:      HPI    Joint Pain    Onset: Since August    Description:   Location: Right heel  Character: Depends on activity, \"most of the time it is a numb, nagging pain\". Stabbing pain last night.    Intensity: moderate    Progression of Symptoms: worse    Accompanying Signs & Symptoms:  Other symptoms: numbness    History:   Previous similar pain: no       Precipitating factors:   Trauma or overuse: YES- Happened when moving in August    Alleviating factors:  Improved by: nothing    Therapies Tried and outcome: Ice, brace, elevation, heat, ibuprofen, rest - does not help    Worse in the morning. Needs to stretch it before it starts to feel better. Is better during the day and then worsens again at night when she lies down in bed.     Answers for HPI/ROS submitted by the patient on 11/3/2017   If you checked off any problems, how difficult have these problems made it for you to do your work, take care of things at home, or get along with other people?: Somewhat difficult  PHQ9 TOTAL SCORE: 7      Problem list and histories reviewed & adjusted, as indicated.  Additional history: as documented    Labs reviewed in EPIC    ROS:  Constitutional, HEENT, cardiovascular, pulmonary, gi and gu systems are negative, except as otherwise noted.      OBJECTIVE:   /74 (BP Location: Left arm, Patient Position: Sitting, Cuff Size: Adult Regular)  Pulse 68  Temp 98  F (36.7  C) (Temporal)  Resp 12  Ht 5' 3.39\" (1.61 m)  There is no height or weight on file to calculate BMI.  GENERAL: healthy, alert and no distress  MS: no gross musculoskeletal defects noted, no edema  Right Ankle Exam   Swelling: None    Tenderness   The patient is experiencing tenderness in the right lateral calcaneus.    Range of Motion   Dorsiflexion:     Normal  Plantar flexion: " Normal  Inversion:         Normal  Eversion:         Normal    Muscle Strength   Dorsiflexion:     5/5  Plantar flexion:     5/5  Anterior tibial:        Posterior tibial:      Gastrosoleus:       Peroneal muscle:    Tests   Anterior drawer: Negative  Varus tilt: NegativeComments  Tenderness when pressing foot posteriorly          NEURO: Normal strength and tone, mentation intact and speech normal    Diagnostic Test Results:  Xray - negative    ASSESSMENT/PLAN:     1. Chronic heel pain, right  2. Plantar fasciitis  Patient has had chronic right lateral heel pain with pain that is worse in the morning and after lying down after a day on her feet. The x-ray of her foot was negative for bony abnormalities. We discussed that her symptoms most accurately reflect plantar fasciitis. I recommended buying a pair of supportive athletic shoes and provided a list of recommended stores.  I also recommended trying night flexion splinting of the foot for 1-2 months to see if this improves symptoms. May try scheduling NSAID for 10-14 days to reduce inflammation.  I placed a referral to podiatry and recommended making an appointment with Dr. Simon if symptoms persist or worsen after trying these recommendations.  - XR Calcaneus Right G/E 2 Views; Future  - ORTHO  REFERRAL    LARRY [facility]

## 2017-11-04 ASSESSMENT — PATIENT HEALTH QUESTIONNAIRE - PHQ9: SUM OF ALL RESPONSES TO PHQ QUESTIONS 1-9: 7

## 2017-11-06 RX ORDER — TEMAZEPAM 15 MG/1
CAPSULE ORAL
Qty: 30 CAPSULE | Refills: 3 | Status: SHIPPED | OUTPATIENT
Start: 2017-11-06 | End: 2018-03-21

## 2017-11-13 ENCOUNTER — OFFICE VISIT (OUTPATIENT)
Dept: PODIATRY | Facility: CLINIC | Age: 42
End: 2017-11-13
Payer: COMMERCIAL

## 2017-11-13 VITALS — TEMPERATURE: 97.3 F | HEIGHT: 63 IN | BODY MASS INDEX: 28.7 KG/M2 | WEIGHT: 162 LBS

## 2017-11-13 DIAGNOSIS — M76.61 ACHILLES TENDINITIS OF RIGHT LOWER EXTREMITY: ICD-10-CM

## 2017-11-13 DIAGNOSIS — M72.2 PLANTAR FASCIAL FIBROMATOSIS: Primary | ICD-10-CM

## 2017-11-13 PROCEDURE — 99203 OFFICE O/P NEW LOW 30 MIN: CPT | Performed by: PODIATRIST

## 2017-11-13 RX ORDER — DICLOFENAC SODIUM 75 MG/1
75 TABLET, DELAYED RELEASE ORAL 2 TIMES DAILY
Qty: 60 TABLET | Refills: 1 | Status: SHIPPED | OUTPATIENT
Start: 2017-11-13 | End: 2017-12-18

## 2017-11-13 ASSESSMENT — PAIN SCALES - GENERAL: PAINLEVEL: MILD PAIN (2)

## 2017-11-13 NOTE — MR AVS SNAPSHOT
After Visit Summary   11/13/2017    Deo Hargrove    MRN: [id number]           Patient Information     Date Of Birth          1975        Visit Information        Provider Department      11/13/2017 8:15 AM Shawn Simon DPM [facility]        Today's Diagnoses     Plantar fascial fibromatosis    -  1    Achilles tendinitis of right lower extremity          Care Instructions    Reliable shoe stores: To maximize your experience and provide the best possible fit.  Be sure to show them your foot concerns and tell them Dr. Simon sent you.      Stores listed in bold have only athletic shoes, and stores that are not bold are mostly casual or variety of shoes    [employer]  [address] [place] [address]  [phone]    [facility] [employer] [place]  [address] [place] [address]  [phone]    [facility] [employer] [facility] [employer]  [address] [place] [address]  [phone]    [employer]  [address]  [facility] [address]  [phone]    [employer]  [address] [place] [address]  [phone]    [employer]  [address] [place] [address]  [phone]                         [employer] Locations:     [address] [place] [address]   [phone]     [address] [place] [address] [address] [place] [address]   [phone]     [address] [place] [address]   [phone]     [address] [place] [address]   [phone]     [address] [place] [address]   [phone]     [address] [facility] [place] [address]   [phone]     [address]  [facility] [address]   [place] [address]   [phone]     [address]   [place], [place] [id number] [phone]             How to find reasonable shoes          The correct width    Correct Fitting    Correct Length      Foot Distortion    Posture Distortion                          Torsional Rigidity       Grasp behind the heel and underneath the foot and twist      Bad    Excessive torsion/twist in midfoot     Less torsion/twist in midfoot is better                   Heel Counter Rigidity      Grasp just above   midsole and squeeze      Bad    Soft heel counter      Good    Rigid Heel Counter      Flexion Rigidity      Grasp shoe and bend from forefoot to rearfoot              PLANTAR FASCIITIS  The  plantar fascia  is a thick fibrous layer of tissue that covers the bones on the bottom of your foot. It supports the foot bones in an arched position.  Plantar fasciitis  is a painful inflammation of the plantar fascia due to overuse. This can develop gradually or suddenly. It usually affects one foot at a time but can affect both feet. Heel pain can be sharp and feel like a knife sticking in the bottom of your foot. Pain may occur after exercising, long distance jogging, stair climbing, long periods of standing, or after getting up from a seated position.  Risk factors include arthritis, diabetes, obesity or recent weight gain, flat-foot, high arch, wearing high heels or loose shoes or shoes with poor arch support.  Sudden changes in activity or shoe gear may contribute to symptoms.  Foot pain from this condition is usually worse in the morning and improves with walking. By the end of the day there may be a dull aching. Treatment requires improved support of feet, short-term rest and controlling inflammation. It may take up to nine months before all symptoms go away with the measures described below.  A steroid injection into the foot, or surgery may be needed if this is becomes long standing or severe.  HOME CARE  1. If you are overweight, lose weight to promote healing.  2. Choose supportive shoes (stiff through the shank) with good arch support and shock absorbency. Replace athletic shoes when they become worn out. Don t walk or run barefoot.  3. Shoe inserts are an important part of treatment. You can buy  "off-the-shelf shoe inserts inexpensively such as [employer].  The best ones are custom molded to your foot with a prescription.  4. Night splints keep the plantar fascia gently stretched while you sleep and will eliminate morning pain. Wear it ALL NIGHT EVERY NIGHT, or any time you sit for a long time.  5. Reduce by 10% or more the activities that stress the feet: jogging, prolonged standing or walking, high impact sports, etc.  6. Stretch your feet. Gently flex your ankle by leaning into a wall or counter or drop your heel from a step.  Stretch two minutes of every hour you are awake.  7. Icing or massage may help heel pain. Apply an ice pack or frozen water or Coke bottle to the heel for 10-20 minutes as a preventive or after an acute flare of symptoms. You may repeat this as needed.   Follow up with your Doctor in 3 weeks as instructed.            Follow-ups after your visit        Additional Services     ORTHOTICS REFERRAL       [facility] scheduling staff may contact patient to arrange appointments for casting of orthotics and often do not leave messages.  The patient may call this number for scheduling at their convenience. Scheduling Phone [phone].      One pair custom functional foot orthotics.   Flexible polypropylene shell with 1/8\" Spenco cushioned top cover, crepe rearfoot post, medial density arch fill, JONY bottom cover.  Aerobic model.                  Who to contact     If you have questions or need follow up information about today's clinic visit or your schedule please contact [facility] directly at [phone].  Normal or non-critical lab and imaging results will be communicated to you by MyChart, letter or phone within 4 business days after the clinic has received the results. If you do not hear from us within 7 days, please contact the clinic through MyChart or phone. If you have a critical or abnormal lab result, we will notify you by phone as soon as possible.  Submit refill " "requests through [employer] or call your pharmacy and they will forward the refill request to us. Please allow 3 business days for your refill to be completed.          Additional Information About Your Visit        [employer] Information     [employer] gives you secure access to your electronic health record. If you see a primary care provider, you can also send messages to your care team and make appointments. If you have questions, please call your primary care clinic.  If you do not have a primary care provider, please call [phone] and they will assist you.        Care EveryWhere ID     This is your Care EveryWhere ID. This could be used by other organizations to access your [place] medical records  [id number]        Your Vitals Were     Temperature Height BMI (Body Mass Index)             97.3  F (36.3  C) (Temporal) 5' 3.39\" (1.61 m) 28.35 kg/m2          Blood Pressure from Last 3 Encounters:   11/03/17 108/74   08/15/17 90/66   10/26/16 108/74    Weight from Last 3 Encounters:   11/13/17 162 lb (73.5 kg)   08/15/17 161 lb 3.2 oz (73.1 kg)   10/26/16 159 lb 3.2 oz (72.2 kg)              We Performed the Following     ORTHOTICS REFERRAL          Today's Medication Changes          These changes are accurate as of: 11/13/17  8:33 AM.  If you have any questions, ask your nurse or doctor.               Start taking these medicines.        Dose/Directions    diclofenac 75 MG EC tablet   Commonly known as:  VOLTAREN   Used for:  Plantar fascial fibromatosis, Achilles tendinitis of right lower extremity   Started by:  Shawn Simon DPM        Dose:  75 mg   Take 1 tablet (75 mg) by mouth 2 times daily   Quantity:  60 tablet   Refills:  1            Where to get your medicines      These medications were sent to [facility] [address] [place], [place] - [address] [address] [facility] of [facility] [address]  [address] [address], [facility] [address]     Phone:  [phone]     diclofenac 75 MG EC tablet             "    Primary Care Provider Office Phone # Fax #    LARRY [facility] [phone] [address] [address] [place] [address]        Equal Access to Services     JENNA CABRERA : Hadii hoang ku angelao Socatrachoali, waaxda luqadaha, qaybta kaalmada adeegyada, waxmarie idiin madin lazaroestefani giordano latavo sosa. So [facility] [phone].    ATENCIÓN: Si habla español, tiene a castañeda disposición servicios gratuitos de asistencia lingüística. Llame al [phone].    We comply with applicable federal civil rights laws and [place] laws. We do not discriminate on the basis of race, color, national origin, age, disability, sex, sexual orientation, or gender identity.            Thank you!     Thank you for choosing [facility]  for your care. Our goal is always to provide you with excellent care. Hearing back from our patients is one way we can continue to improve our services. Please take a few minutes to complete the written survey that you may receive in the mail after your visit with us. Thank you!             Your Updated Medication List - Protect others around you: Learn how to safely use, store and throw away your medicines at www.disposemymeds.org.          This list is accurate as of: 11/13/17  8:33 AM.  Always use your most recent med list.                   Brand Name Dispense Instructions for use Diagnosis    busPIRone 5 MG tablet    BUSPAR    150 tablet    Start at 5 mg twice daily for 3 days, then 7.5 mg (1.5 tabs) twice daily for 3 days, then 10 mg (2 tabs) twice daily for 3 days, then 12.5 mg (2.5 tabs) twice daily for 3 days, then 15 mg (3 tabs) twice daily and stay at that dose    Generalized anxiety disorder       * carBAMazepine 50 mg chewable half-tab    TEGretol     Take 100 mg by mouth 2 times daily        * carBAMazepine 200 MG 12 hr capsule    CARBATROL     Take 200 mg by mouth 2 times daily        diclofenac 75 MG EC tablet    VOLTAREN    60 tablet    Take 1 tablet (75 mg) by  mouth 2 times daily    Plantar fascial fibromatosis, Achilles tendinitis of right lower extremity       FLUoxetine 20 MG capsule    PROzac    60 capsule    Take 2 capsules (40 mg) by mouth daily    Anxiety       LORazepam 0.5 MG tablet    ATIVAN    20 tablet    TAKE 1 TABLET BY MOUTH EVERY DAY AS NEEDED    Anxiety       temazepam 15 MG capsule    RESTORIL    30 capsule    TAKE ONE CAPSULE BY MOUTH NIGHTLY AS NEEDED    Insomnia, unspecified type       topiramate 100 MG tablet    TOPAMAX    60 tablet    Take 1 tablet (100 mg) by mouth 2 times daily Prescribed by Neurologist        traMADol 50 MG tablet    ULTRAM     TK 1 T PO  AT THE ONSET OF MIGRAINE. CAN REPEAT ONCE AFTER 2 HOURS IF NEEDED        * Notice:  This list has 2 medication(s) that are the same as other medications prescribed for you. Read the directions carefully, and ask your doctor or other care provider to review them with you.

## 2017-11-13 NOTE — LETTER
2017         RE: Deo Hargrove  [address] [address] [address]        Dear Colleague,    Thank you for referring your patient, Deo Hargrove, to the [facility]. Please see a copy of my visit note below.    HPI:  Deo Hargrove is a 42 year old female who is seen in consultation at the request of Rosalinda Ho, APRN CNP.    Pt presents for eval of:   (Onset, Location, L/R, Character, Treatments, Injury if yes)    XRAY Right calcaneus 11/3/2017     Plantar Right heel pain started after moving in Aug 2017 while wearing flip flops. Presents today with [employer] slip on shoes.  Sharp, stabbing, burning, dull ache, numbness, pain 2-7, hard to get comfortable while lying down, tightness with first steps after lying or sitting, lateral Right ankle swelling  Ibuprofen, stretching, ice, hot tub soaking, rest, elevation, ankle compression sleeve    Works as a Nurse Manager.      Weight management plan: Patient was referred to their PCP to discuss a diet and exercise plan.=    ROS: 10 point ROS neg other than the symptoms noted above in the HPI.    PAST MEDICAL HISTORY:   Past Medical History:   Diagnosis Date     Anxiety      Headaches, migraine      Kidney stone      PAST SURGICAL HISTORY:   Past Surgical History:   Procedure Laterality Date      SECTION       GYN SURGERY      had LT tube removed     MEDICATIONS:   Current Outpatient Prescriptions:      diclofenac (VOLTAREN) 75 MG EC tablet, Take 1 tablet (75 mg) by mouth 2 times daily, Disp: 60 tablet, Rfl: 1     temazepam (RESTORIL) 15 MG capsule, TAKE ONE CAPSULE BY MOUTH NIGHTLY AS NEEDED, Disp: 30 capsule, Rfl: 3     busPIRone (BUSPAR) 5 MG tablet, Start at 5 mg twice daily for 3 days, then 7.5 mg (1.5 tabs) twice daily for 3 days, then 10 mg (2 tabs) twice daily for 3 days, then 12.5 mg (2.5 tabs) twice daily for 3 days, then 15 mg (3 tabs) twice daily and stay at that dose, Disp: 150 tablet, Rfl: 0     FLUoxetine (PROZAC)  "20 MG capsule, Take 2 capsules (40 mg) by mouth daily, Disp: 60 capsule, Rfl: 5     traMADol (ULTRAM) 50 MG tablet, TK 1 T PO  AT THE ONSET OF MIGRAINE. CAN REPEAT ONCE AFTER 2 HOURS IF NEEDED, Disp: , Rfl: 1     LORazepam (ATIVAN) 0.5 MG tablet, TAKE 1 TABLET BY MOUTH EVERY DAY AS NEEDED, Disp: 20 tablet, Rfl: 3     carBAMazepine (CARBATROL) 200 MG 12 hr capsule, Take 200 mg by mouth 2 times daily, Disp: , Rfl: 3     topiramate (TOPAMAX) 100 MG tablet, Take 1 tablet (100 mg) by mouth 2 times daily Prescribed by Neurologist, Disp: 60 tablet, Rfl: 1     carBAMazepine (TEGRETOL), Take 100 mg by mouth 2 times daily , Disp: , Rfl:   ALLERGIES:    Allergies   Allergen Reactions     Penicillins      Triptans [Sumatriptan]      Tightness of the throat     SOCIAL HISTORY:   Social History     Social History     Marital status:      Spouse name: N/A     Number of children: N/A     Years of education: N/A     Occupational History     Not on file.     Social History Main Topics     Smoking status: Never Smoker     Smokeless tobacco: Never Used     Alcohol use Yes      Comment: occasional     Drug use: No     Sexual activity: Yes     Partners: Male     Birth control/ protection: IUD     Other Topics Concern     Parent/Sibling W/ Cabg, Mi Or Angioplasty Before 65f 55m? Yes     Maternal Grandfather     Social History Narrative     FAMILY HISTORY:   Family History   Problem Relation Age of Onset     Hypertension Mother      Lipids Mother      Alcohol/Drug Father      Breast Cancer Maternal Grandmother      Breast Cancer Paternal Grandmother        EXAM:Vitals: Temp 97.3  F (36.3  C) (Temporal)  Ht 5' 3.39\" (1.61 m)  Wt 162 lb (73.5 kg)  BMI 28.35 kg/m2  BMI= Body mass index is 28.35 kg/(m^2).    General appearance: Patient is alert and fully cooperative with history & exam.  No sign of distress is noted during the visit.     Psychiatric: Affect is pleasant & appropriate.  Patient appears motivated to improve health.   "   Respiratory: Breathing is regular & unlabored while sitting.     HEENT: Hearing is intact to spoken word.  Speech is clear.  No gross evidence of visual impairment that would impact ambulation.     Vascular: DP & PT 2/4 & regular bilaterally.  No significant edema, rubor or varicosities noted.  CFT and skin temperature is normal to both lower extremities.       Neurologic: Lower extremity sensation is intact to light touch.  No evidence of weakness in the lower extremities.  No evidence of neuropathy and negative tinel sign.     Dermatologic: Skin is intact to both lower extremities without significant lesions, rash or abrasion.  Normal texture turgor and tone. No paronychia or evidence of soft tissue infection is noted.    Musculoskeletal: Patient is ambulatory without assistive device or brace. Pain is noted with firm palpation along the lateral band of the plantar fascia right foot most notably at the origination upon the calcaneus not through the arch.  No pain with compression of the calcaneus medial to lateral or with palpation of the peroneal or posterior tibial tendons.  Slightly more than 0  of ankle joint dorsiflexion without crepitus or pain throughout the ankle, subtalar or midtarsal joints.  No pain or limitations throughout manual muscle strength testing plus 5/5 to all four quadrants bilateral.  No palpable edema noted.      There is a palpable bone prominence on right greater than left superior lateral aspect of the calcaneus and discomfort at the insertion of the superior lateral Achilles right foot only.    Hypermobile midfoot and prominent high instep.    Radiographs:  Nonweightbearing calcaneal axial and lateral demonstrate no obvious osteophyte or loose bodies. No acute cortical reaction.     ASSESSMENT:       ICD-10-CM    1. Plantar fascial fibromatosis M72.2 ORTHOTICS REFERRAL     diclofenac (VOLTAREN) 75 MG EC tablet   2. Achilles tendinitis of right lower extremity M76.61 ORTHOTICS  REFERRAL     diclofenac (VOLTAREN) 75 MG EC tablet       PLAN:  Reviewed patient's [facility] in [facility] and discussed etiology and treatment options.      Treatments:  11/13/2017  Discontinue barefoot walking or unsupported walking in shoes without shank.  Dispensed written instructions to obtain appropriate shoe gear and/or OTC inserts.  Dispensed anterior night splint to use all night every night.  Prescription oral Voltaren for a short course. Discussed risks.  Prescription for custom molded orthotics 11/13/2017 to dress hypermobile midfoot and high instep  Follow up in 4-5 weeks     Achilles tendinitis and plantar fasciitis right foot    Shawn Simon DPM      Again, thank you for allowing me to participate in the care of your patient.        Sincerely,        Shawn Simon DPM

## 2017-11-13 NOTE — PROGRESS NOTES
HPI:  Deo Hargrove is a 42 year old female who is seen in consultation at the request of Rosalinda Ho, APRN CNP.    Pt presents for eval of:   (Onset, Location, L/R, Character, Treatments, Injury if yes)    XRAY Right calcaneus 11/3/2017     Plantar Right heel pain started after moving in Aug 2017 while wearing flip flops. Presents today with [employer] slip on shoes.  Sharp, stabbing, burning, dull ache, numbness, pain 2-7, hard to get comfortable while lying down, tightness with first steps after lying or sitting, lateral Right ankle swelling  Ibuprofen, stretching, ice, hot tub soaking, rest, elevation, ankle compression sleeve    Works as a Nurse Manager.      Weight management plan: Patient was referred to their PCP to discuss a diet and exercise plan.=    ROS: 10 point ROS neg other than the symptoms noted above in the HPI.    PAST MEDICAL HISTORY:   Past Medical History:   Diagnosis Date     Anxiety      Headaches, migraine      Kidney stone      PAST SURGICAL HISTORY:   Past Surgical History:   Procedure Laterality Date      SECTION       GYN SURGERY      had LT tube removed     MEDICATIONS:   Current Outpatient Prescriptions:      diclofenac (VOLTAREN) 75 MG EC tablet, Take 1 tablet (75 mg) by mouth 2 times daily, Disp: 60 tablet, Rfl: 1     temazepam (RESTORIL) 15 MG capsule, TAKE ONE CAPSULE BY MOUTH NIGHTLY AS NEEDED, Disp: 30 capsule, Rfl: 3     busPIRone (BUSPAR) 5 MG tablet, Start at 5 mg twice daily for 3 days, then 7.5 mg (1.5 tabs) twice daily for 3 days, then 10 mg (2 tabs) twice daily for 3 days, then 12.5 mg (2.5 tabs) twice daily for 3 days, then 15 mg (3 tabs) twice daily and stay at that dose, Disp: 150 tablet, Rfl: 0     FLUoxetine (PROZAC) 20 MG capsule, Take 2 capsules (40 mg) by mouth daily, Disp: 60 capsule, Rfl: 5     traMADol (ULTRAM) 50 MG tablet, TK 1 T PO  AT THE ONSET OF MIGRAINE. CAN REPEAT ONCE AFTER 2 HOURS IF NEEDED, Disp: , Rfl: 1     LORazepam (ATIVAN) 0.5 MG  "tablet, TAKE 1 TABLET BY MOUTH EVERY DAY AS NEEDED, Disp: 20 tablet, Rfl: 3     carBAMazepine (CARBATROL) 200 MG 12 hr capsule, Take 200 mg by mouth 2 times daily, Disp: , Rfl: 3     topiramate (TOPAMAX) 100 MG tablet, Take 1 tablet (100 mg) by mouth 2 times daily Prescribed by Neurologist, Disp: 60 tablet, Rfl: 1     carBAMazepine (TEGRETOL), Take 100 mg by mouth 2 times daily , Disp: , Rfl:   ALLERGIES:    Allergies   Allergen Reactions     Penicillins      Triptans [Sumatriptan]      Tightness of the throat     SOCIAL HISTORY:   Social History     Social History     Marital status:      Spouse name: N/A     Number of children: N/A     Years of education: N/A     Occupational History     Not on file.     Social History Main Topics     Smoking status: Never Smoker     Smokeless tobacco: Never Used     Alcohol use Yes      Comment: occasional     Drug use: No     Sexual activity: Yes     Partners: Male     Birth control/ protection: IUD     Other Topics Concern     Parent/Sibling W/ Cabg, Mi Or Angioplasty Before 65f 55m? Yes     Maternal Grandfather     Social History Narrative     FAMILY HISTORY:   Family History   Problem Relation Age of Onset     Hypertension Mother      Lipids Mother      Alcohol/Drug Father      Breast Cancer Maternal Grandmother      Breast Cancer Paternal Grandmother        EXAM:Vitals: Temp 97.3  F (36.3  C) (Temporal)  Ht 5' 3.39\" (1.61 m)  Wt 162 lb (73.5 kg)  BMI 28.35 kg/m2  BMI= Body mass index is 28.35 kg/(m^2).    General appearance: Patient is alert and fully cooperative with history & exam.  No sign of distress is noted during the visit.     Psychiatric: Affect is pleasant & appropriate.  Patient appears motivated to improve health.     Respiratory: Breathing is regular & unlabored while sitting.     HEENT: Hearing is intact to spoken word.  Speech is clear.  No gross evidence of visual impairment that would impact ambulation.     Vascular: DP & PT 2/4 & regular " bilaterally.  No significant edema, rubor or varicosities noted.  CFT and skin temperature is normal to both lower extremities.       Neurologic: Lower extremity sensation is intact to light touch.  No evidence of weakness in the lower extremities.  No evidence of neuropathy and negative tinel sign.     Dermatologic: Skin is intact to both lower extremities without significant lesions, rash or abrasion.  Normal texture turgor and tone. No paronychia or evidence of soft tissue infection is noted.    Musculoskeletal: Patient is ambulatory without assistive device or brace. Pain is noted with firm palpation along the lateral band of the plantar fascia right foot most notably at the origination upon the calcaneus not through the arch.  No pain with compression of the calcaneus medial to lateral or with palpation of the peroneal or posterior tibial tendons.  Slightly more than 0  of ankle joint dorsiflexion without crepitus or pain throughout the ankle, subtalar or midtarsal joints.  No pain or limitations throughout manual muscle strength testing plus 5/5 to all four quadrants bilateral.  No palpable edema noted.      There is a palpable bone prominence on right greater than left superior lateral aspect of the calcaneus and discomfort at the insertion of the superior lateral Achilles right foot only.    Hypermobile midfoot and prominent high instep.    Radiographs:  Nonweightbearing calcaneal axial and lateral demonstrate no obvious osteophyte or loose bodies. No acute cortical reaction.     ASSESSMENT:       ICD-10-CM    1. Plantar fascial fibromatosis M72.2 ORTHOTICS REFERRAL     diclofenac (VOLTAREN) 75 MG EC tablet   2. Achilles tendinitis of right lower extremity M76.61 ORTHOTICS REFERRAL     diclofenac (VOLTAREN) 75 MG EC tablet       PLAN:  Reviewed patient's chart in [facility] and discussed etiology and treatment options.      Treatments:  11/13/2017  Discontinue barefoot walking or unsupported walking in shoes  without shank.  Dispensed written instructions to obtain appropriate shoe gear and/or OTC inserts.  Dispensed anterior night splint to use all night every night.  Prescription oral Voltaren for a short course. Discussed risks.  Prescription for custom molded orthotics 11/13/2017 to dress hypermobile midfoot and high instep  Follow up in 4-5 weeks     Achilles tendinitis and plantar fasciitis right foot    Shawn Simon DPM

## 2017-11-13 NOTE — PATIENT INSTRUCTIONS
Reliable shoe stores: To maximize your experience and provide the best possible fit.  Be sure to show them your foot concerns and tell them Dr. Simon sent you.      Stores listed in bold have only athletic shoes, and stores that are not bold are mostly casual or variety of shoes    [employer]  [address] [place] [address]  [phone]    [employer]  [address] [place] [address]  [phone]     [employer]  [address] [place] [address]  [phone]    [employer]  [address] [facility]  [facility] [address]  [phone]    [employer]  [address] [place], [place] [address] [phone]    [employer]  [address] [place] [address]  [phone]                         [employer] Locations:     [address] [place] [address]   [phone]     [address] [place] [address] [address] [place] [address]   [phone]     [address] [place] [address]   [phone]     [address] [facility] [place] [address]   [phone]     [address] [facility] [place] [address]   [phone]     [address] [place] [address]   [phone]     [address] [facility] [address]   [place] [address]   [phone]     [address] [facility]. [address]   [place] [address]   [phone]             How to find reasonable shoes          The correct width    Correct Fitting    Correct Length      Foot Distortion    Posture Distortion                          Torsional Rigidity      Grasp behind the heel and underneath the foot and twist      Bad    Excessive torsion/twist in midfoot     Less torsion/twist in midfoot is better                   Heel Counter Rigidity      Grasp just above   midsole and squeeze      Bad    Soft heel counter      Good    Rigid Heel Counter      Flexion Rigidity      Grasp shoe and bend from forefoot to rearfoot              PLANTAR FASCIITIS  The  plantar fascia  is a  thick fibrous layer of tissue that covers the bones on the bottom of your foot. It supports the foot bones in an arched position.  Plantar fasciitis  is a painful inflammation of the plantar fascia due to overuse. This can develop gradually or suddenly. It usually affects one foot at a time but can affect both feet. Heel pain can be sharp and feel like a knife sticking in the bottom of your foot. Pain may occur after exercising, long distance jogging, stair climbing, long periods of standing, or after getting up from a seated position.  Risk factors include arthritis, diabetes, obesity or recent weight gain, flat-foot, high arch, wearing high heels or loose shoes or shoes with poor arch support.  Sudden changes in activity or shoe gear may contribute to symptoms.  Foot pain from this condition is usually worse in the morning and improves with walking. By the end of the day there may be a dull aching. Treatment requires improved support of feet, short-term rest and controlling inflammation. It may take up to nine months before all symptoms go away with the measures described below.  A steroid injection into the foot, or surgery may be needed if this is becomes long standing or severe.  HOME CARE  1. If you are overweight, lose weight to promote healing.  2. Choose supportive shoes (stiff through the shank) with good arch support and shock absorbency. Replace athletic shoes when they become worn out. Don t walk or run barefoot.  3. Shoe inserts are an important part of treatment. You can buy off-the-shelf shoe inserts inexpensively such as [employer].  The best ones are custom molded to your foot with a prescription.  4. Night splints keep the plantar fascia gently stretched while you sleep and will eliminate morning pain. Wear it ALL NIGHT EVERY NIGHT, or any time you sit for a long time.  5. Reduce by 10% or more the activities that stress the feet: jogging, prolonged standing or walking, high impact sports, etc.  6.  Stretch your feet. Gently flex your ankle by leaning into a wall or counter or drop your heel from a step.  Stretch two minutes of every hour you are awake.  7. Icing or massage may help heel pain. Apply an ice pack or frozen water or Coke bottle to the heel for 10-20 minutes as a preventive or after an acute flare of symptoms. You may repeat this as needed.   Follow up with your Doctor in 3 weeks as instructed.

## 2017-11-13 NOTE — NURSING NOTE
"Chief Complaint   Patient presents with     Consult     Right heel pain 2, onset Aug 2017; new pt       Initial Temp 97.3  F (36.3  C) (Temporal)  Ht 5' 3.39\" (1.61 m)  Wt 162 lb (73.5 kg)  BMI 28.35 kg/m2 Estimated body mass index is 28.35 kg/(m^2) as calculated from the following:    Height as of this encounter: 5' 3.39\" (1.61 m).    Weight as of this encounter: 162 lb (73.5 kg).  BP completed using cuff size: NA (Not Taken)  Medication Reconciliation: complete    Grace Salmeron CMA, November 13, 2017  "

## 2017-11-20 ENCOUNTER — MYC MEDICAL ADVICE (OUTPATIENT)
Dept: FAMILY MEDICINE | Facility: OTHER | Age: 42
End: 2017-11-20

## 2017-11-20 DIAGNOSIS — F41.1 GENERALIZED ANXIETY DISORDER: Primary | ICD-10-CM

## 2017-11-21 NOTE — TELEPHONE ENCOUNTER
busPIRone (BUSPAR) 5 MG tablet  Routing refill request to provider for review/approval because:  Patient needs to be seen because:  Due for 1 month E-Visit   Signature to be adjusted to a consistent dose. Prescription was a starter to titrate up the dose.     Next 5 appointments (look out 90 days)     Dec 18, 2017  4:15 PM CST   Return Visit with Shawn Simon DPM   [facility] ([facility])    [address] [place] [address] [phone]                   Last PHQ-9 score on record=   PHQ-9 SCORE 11/3/2017   Total Score -   Total Score MyChart 7 (Mild depression)   Total Score 7       Lab Results   Component Value Date    AST 23 04/18/2015     Lab Results   Component Value Date    ALT 34 10/26/2016     Rachelle Barry RN, BSN

## 2017-11-21 NOTE — TELEPHONE ENCOUNTER
Patient states Precious told her she did not need any type of visit. That she could just call and lm to let you know how she is doing.   She is doing fine.

## 2017-11-22 RX ORDER — BUSPIRONE HYDROCHLORIDE 15 MG/1
15 TABLET ORAL 2 TIMES DAILY
Qty: 60 TABLET | Refills: 5 | Status: SHIPPED | OUTPATIENT
Start: 2017-11-22 | End: 2018-04-27

## 2017-12-18 ENCOUNTER — OFFICE VISIT (OUTPATIENT)
Dept: PODIATRY | Facility: CLINIC | Age: 42
End: 2017-12-18
Payer: COMMERCIAL

## 2017-12-18 VITALS — BODY MASS INDEX: 28.7 KG/M2 | WEIGHT: 162 LBS | TEMPERATURE: 97.8 F | HEIGHT: 63 IN

## 2017-12-18 DIAGNOSIS — M76.61 ACHILLES TENDINITIS OF RIGHT LOWER EXTREMITY: ICD-10-CM

## 2017-12-18 DIAGNOSIS — M72.2 PLANTAR FASCIAL FIBROMATOSIS: Primary | ICD-10-CM

## 2017-12-18 PROCEDURE — 99213 OFFICE O/P EST LOW 20 MIN: CPT | Performed by: PODIATRIST

## 2017-12-18 RX ORDER — DICLOFENAC SODIUM 75 MG/1
75 TABLET, DELAYED RELEASE ORAL 2 TIMES DAILY
Qty: 60 TABLET | Refills: 1 | Status: SHIPPED | OUTPATIENT
Start: 2017-12-18 | End: 2018-07-24

## 2017-12-18 ASSESSMENT — PAIN SCALES - GENERAL: PAINLEVEL: MILD PAIN (2)

## 2017-12-18 NOTE — NURSING NOTE
"Chief Complaint   Patient presents with     RECHECK     some relief from new shoes, voltaren, Night Splint, waiting for orthoics to arrive, pain 2, now radiates up leg or to toes w/numbness, instead of shooting pain in one area, also NO tightness w/first steps after lying or sitting - Right heel plantar fasciitis and achilles tendonitis, onset Aug 2017; LOV 11/13/2017       Initial Temp 97.8  F (36.6  C) (Temporal)  Ht 5' 3.39\" (1.61 m)  Wt 162 lb (73.5 kg)  BMI 28.35 kg/m2 Estimated body mass index is 28.35 kg/(m^2) as calculated from the following:    Height as of this encounter: 5' 3.39\" (1.61 m).    Weight as of this encounter: 162 lb (73.5 kg).  BP completed using cuff size: NA (Not Taken)  Medication Reconciliation: complete    Grace Salmeron CMA, December 18, 2017  "

## 2017-12-18 NOTE — Clinical Note
"    12/18/2017         RE: Deo Hargrove  [address] [facility] [address]        Dear Colleague,    Thank you for referring your patient, Deo Hargrove, to the [facility]. Please see a copy of my visit note below.    Chief Complaint   Patient presents with     RECHECK     some relief from new shoes, voltaren, Night Splint, waiting for orthoics to arrive, pain 2, now radiates up leg or to toes w/numbness, instead of shooting pain in one area, also NO tightness w/first steps after lying or sitting - Right heel plantar fasciitis and achilles tendonitis, onset Aug 2017; LOV 11/13/2017       Weight management plan: Patient was referred to their PCP to discuss a diet and exercise plan.     HPI:  Deo Hargrove is a 42 year old female who is seen in consultation at the request of Rosalinda Ho, APRN CNP.    Pt presents for eval of:   (Onset, Location, L/R, Character, Treatments, Injury if yes)    XRAY Right calcaneus 11/3/2017     Plantar Right heel pain started after moving in Aug 2017 while wearing flip flops. Presents today with [employer] slip on shoes.  Sharp, stabbing, burning, dull ache, numbness, pain 2-7, hard to get comfortable while lying down, tightness with first steps after lying or sitting, lateral Right ankle swelling  Ibuprofen, stretching, ice, hot tub soaking, rest, elevation, ankle compression sleeve    Works as a Nurse Manager.     EXAM:Vitals: Temp 97.8  F (36.6  C) (Temporal)  Ht 5' 3.39\" (1.61 m)  Wt 162 lb (73.5 kg)  BMI 28.35 kg/m2  BMI= Body mass index is 28.35 kg/(m^2).    General appearance: Patient is alert and fully cooperative with history & exam.  No sign of distress is noted during the visit.     Psychiatric: Affect is pleasant & appropriate.  Patient appears motivated to improve health.     Respiratory: Breathing is regular & unlabored while sitting.     HEENT: Hearing is intact to spoken word.  Speech is clear.  No gross evidence of visual impairment that " would impact ambulation.     Vascular: DP & PT 2/4 & regular bilaterally.  No significant edema, rubor or varicosities noted.  CFT and skin temperature is normal to both lower extremities.       Neurologic: Lower extremity sensation is intact to light touch.  No evidence of weakness in the lower extremities.  No evidence of neuropathy and negative tinel sign.     Dermatologic: Skin is intact to both lower extremities without significant lesions, rash or abrasion.  Normal texture turgor and tone. No paronychia or evidence of soft tissue infection is noted.    Musculoskeletal: Patient is ambulatory without assistive device or brace.  Much reduced discomfort is noted with firm palpation along the lateral band of the plantar fascia right foot most notably at the origination upon the calcaneus not through the arch.  Also discomfort about the insertion of the Achilles tendon superior lateral and very subtle discomfort along the peroneal tendons on the right foot only.  No edema through any of the structures.  No pain or limitation throughout range of motion no crepitus throughout range of motion of the subtalar joint.  Subtle prominence about the peroneal tendons about the distal fibula however this is equal bilateral.    There is a palpable bone prominence on right greater than left superior lateral aspect of the calcaneus and discomfort at the insertion of the superior lateral Achilles right foot only.    Hypermobile midfoot and prominent high instep.    Radiographs:  Nonweightbearing calcaneal axial and lateral demonstrate no obvious osteophyte or loose bodies. No acute cortical reaction.     ASSESSMENT:       ICD-10-CM    1. Plantar fascial fibromatosis M72.2 diclofenac (VOLTAREN) 75 MG EC tablet   2. Achilles tendinitis of right lower extremity M76.61 diclofenac (VOLTAREN) 75 MG EC tablet       PLAN:  Reviewed patient's chart in [facility] and discussed etiology and treatment options.       Treatments:  11/13/2017  Discontinue barefoot walking or unsupported walking in shoes without shank.  Dispensed written instructions to obtain appropriate shoe gear and/or OTC inserts.  Dispensed anterior night splint to use all night every night.  Prescription oral Voltaren for a short course. Discussed risks.  Prescription for custom molded orthotics 11/13/2017 to dress hypermobile midfoot and high instep  Follow up in 4-5 weeks     Achilles tendinitis and plantar fasciitis right foot    12/18/2017  Is using NS, voltaren, been molded for orthotics  Pain is 2/10.   Stay in shoes, voltaren, night splint and get the orthotics asap    If not getting better then will order MRI right ankle.     Shawn Simon DPM    Again, thank you for allowing me to participate in the care of your patient.        Sincerely,        Shawn Simon DPM

## 2017-12-18 NOTE — MR AVS SNAPSHOT
"              After Visit Summary   12/18/2017    Deo Hargrove    MRN: [id number]           Patient Information     Date Of Birth          1975        Visit Information        Provider Department      12/18/2017 4:15 PM Shawn Simon DPM [facility]        Today's Diagnoses     Plantar fascial fibromatosis    -  1    Achilles tendinitis of right lower extremity          Care Instructions    Follow-up in the next month or 2 with any continued symptoms otherwise as needed.          Follow-ups after your visit        Who to contact     If you have questions or need follow up information about today's clinic visit or your schedule please contact [facility] directly at [phone].  Normal or non-critical lab and imaging results will be communicated to you by [employer], letter or phone within 4 business days after the clinic has received the results. If you do not hear from us within 7 days, please contact the clinic through [employer] or phone. If you have a critical or abnormal lab result, we will notify you by phone as soon as possible.  Submit refill requests through [employer] or call your pharmacy and they will forward the refill request to us. Please allow 3 business days for your refill to be completed.          Additional Information About Your Visit        MyChart Information     [employer] gives you secure access to your electronic health record. If you see a primary care provider, you can also send messages to your care team and make appointments. If you have questions, please call your primary care clinic.  If you do not have a primary care provider, please call [phone] and they will assist you.        Care EveryWhere ID     This is your Care EveryWhere ID. This could be used by other organizations to access your [facility] medical records  [id number]        Your Vitals Were     Temperature Height BMI (Body Mass Index)             97.8  F (36.6  C) (Temporal) 5' 3.39\" " (1.61 m) 28.35 kg/m2          Blood Pressure from Last 3 Encounters:   11/03/17 108/74   08/15/17 90/66   10/26/16 108/74    Weight from Last 3 Encounters:   12/18/17 162 lb (73.5 kg)   11/13/17 162 lb (73.5 kg)   08/15/17 161 lb 3.2 oz (73.1 kg)              Today, you had the following     No orders found for display         Where to get your medicines      These medications were sent to [id number] - [place] - [address] [address] [facility] AT [facility] [address]  [address], [facility] [address]     Phone:  [phone]     diclofenac 75 MG EC tablet          Primary Care Provider Office Phone # Fax #    Rosalinda Maxwelln, APRN [facility] [phone] [address] [facility] [address]        Equal Access to Services     JENNA CABRERA AH: Hadii aad ku hadasho Soomaali, waaxda luqadaha, qaybta kaalmada adeegyada, waxay idiin haybaudilion troy fajardoaralissette cooper . So [facility] [phone].    ATENCIÓN: Si habla español, tiene a castañeda disposición servicios gratuitos de asistencia lingüística. [facility] [phone].    We comply with applicable federal civil rights laws and [place] laws. We do not discriminate on the basis of race, color, national origin, age, disability, sex, sexual orientation, or gender identity.            Thank you!     Thank you for choosing [facility]  for your care. Our goal is always to provide you with excellent care. Hearing back from our patients is one way we can continue to improve our services. Please take a few minutes to complete the written survey that you may receive in the mail after your visit with us. Thank you!             Your Updated Medication List - Protect others around you: Learn how to safely use, store and throw away your medicines at www.disposemymeds.org.          This list is accurate as of: 12/18/17  5:21 PM.  Always use your most recent med list.                   Brand Name Dispense Instructions for use Diagnosis    *  busPIRone 5 MG tablet    BUSPAR    150 tablet    Start at 5 mg twice daily for 3 days, then 7.5 mg (1.5 tabs) twice daily for 3 days, then 10 mg (2 tabs) twice daily for 3 days, then 12.5 mg (2.5 tabs) twice daily for 3 days, then 15 mg (3 tabs) twice daily and stay at that dose    Generalized anxiety disorder       * busPIRone 15 MG tablet    BUSPAR    60 tablet    Take 1 tablet (15 mg) by mouth 2 times daily    Generalized anxiety disorder       * carBAMazepine 50 mg chewable half-tab    TEGretol     Take 100 mg by mouth 2 times daily        * carBAMazepine 200 MG 12 hr capsule    CARBATROL     Take 200 mg by mouth 2 times daily        diclofenac 75 MG EC tablet    VOLTAREN    60 tablet    Take 1 tablet (75 mg) by mouth 2 times daily    Plantar fascial fibromatosis, Achilles tendinitis of right lower extremity       FLUoxetine 20 MG capsule    PROzac    60 capsule    Take 2 capsules (40 mg) by mouth daily    Anxiety       LORazepam 0.5 MG tablet    ATIVAN    20 tablet    TAKE 1 TABLET BY MOUTH EVERY DAY AS NEEDED    Anxiety       temazepam 15 MG capsule    RESTORIL    30 capsule    TAKE ONE CAPSULE BY MOUTH NIGHTLY AS NEEDED    Insomnia, unspecified type       topiramate 100 MG tablet    TOPAMAX    60 tablet    Take 1 tablet (100 mg) by mouth 2 times daily Prescribed by Neurologist        traMADol 50 MG tablet    ULTRAM     TK 1 T PO  AT THE ONSET OF MIGRAINE. CAN REPEAT ONCE AFTER 2 HOURS IF NEEDED        * Notice:  This list has 4 medication(s) that are the same as other medications prescribed for you. Read the directions carefully, and ask your doctor or other care provider to review them with you.

## 2018-02-27 DIAGNOSIS — F41.9 ANXIETY: ICD-10-CM

## 2018-02-27 NOTE — TELEPHONE ENCOUNTER
Pending Prescriptions:                       Disp   Refills    LORazepam (ATIVAN) 0.5 MG tablet [Pharmac*20 tab*0            Sig: TAKE 1 TABLET BY MOUTH EVERY DAILY AS NEEDED    Last ov 11/03/2017    Routing refill request to provider for review/approval because:  Drug not on the [facility] refill protocol     Michoacano Kelley RN, BSN

## 2018-02-28 RX ORDER — LORAZEPAM 0.5 MG/1
TABLET ORAL
Qty: 20 TABLET | Refills: 0 | Status: SHIPPED | OUTPATIENT
Start: 2018-02-28 | End: 2018-04-27

## 2018-03-21 ENCOUNTER — TELEPHONE (OUTPATIENT)
Dept: FAMILY MEDICINE | Facility: OTHER | Age: 43
End: 2018-03-21

## 2018-03-21 DIAGNOSIS — G47.00 INSOMNIA, UNSPECIFIED TYPE: ICD-10-CM

## 2018-03-21 NOTE — LETTER
Deo Hargrove  [address] [facility] [address]    March 27, 2018      Dear Deo Hargrove    APPOINTMENT REMINDER:   Our records indicates that it is time for you to be seen for a recheck.     Your current medication request will be approved for one refill but you will need to be seen before any additional refills can be approved.  Taking care of your health is important to us, and ongoing visits with your provider are vital to your care.    We look forward to seeing you in the near future.  You may call our office at [phone] to schedule a visit.     Please disregard this notice if you have already made an appointment.      Sincerely,    [facility]    [facility]  [address] [place] [address]  Phone: [phone]

## 2018-03-22 NOTE — TELEPHONE ENCOUNTER
Requested Prescriptions   Pending Prescriptions Disp Refills     temazepam (RESTORIL) 15 MG capsule 30 capsule 3     Sig: TAKE ONE CAPSULE BY MOUTH NIGHTLY AS NEEDED    There is no refill protocol information for this order        Last ov 11/03/2017    Routing refill request to provider for review/approval because:  Drug not on the [facility] refill protocol     Michoacano Kelley RN, BSN

## 2018-03-25 NOTE — TELEPHONE ENCOUNTER
Patient due for follow up on this. Please call to schedule office visit. I will send in one month refill on Monday.   Thanks,  Rosalinda Ho, CNP

## 2018-03-26 RX ORDER — TEMAZEPAM 15 MG/1
CAPSULE ORAL
Qty: 30 CAPSULE | Refills: 0 | Status: SHIPPED | OUTPATIENT
Start: 2018-03-26 | End: 2018-04-27

## 2018-03-26 NOTE — TELEPHONE ENCOUNTER
Left message for patient to return call. Please see message below nad help patient schedule appointment. Rx faxed to pharmacy.

## 2018-04-24 NOTE — PROGRESS NOTES
SUBJECTIVE:   Deo Hargrove is a 42 year old female who presents to clinic today for the following health issues:      History of Present Illness     Depression & Anxiety Follow-up:     Depression/Anxiety:  Anxiety only    Status since last visit::  Worsened    Other associated symptoms of anxiety::  None    Significant life event::  YES    Current substance use::  None    Depression symptoms::  YES       Today's PHQ-9         PHQ-9 Total Score:     (P) 10   PHQ-9 Q9 Suicidal ideation:   (P) Not at all   Thoughts of suicide or self harm:      Self-harm Plan:        Self-harm Action:          Safety concerns for self or others:       BONNIE-7 Total Score: (P) 14    Diet:  Regular (no restrictions)  Frequency of exercise:  1 day/week  Duration of exercise:  15-30 minutes  Taking medications regularly:  Yes  Medication side effects:  None  Additional concerns today:  No      Would like to try and get 90 day supply of buspirone and fluoxetine.    Answers for HPI/ROS submitted by the patient on 4/27/2018   PHQ-2 Score: 3  If you checked off any problems, how difficult have these problems made it for you to do your work, take care of things at home, or get along with other people?: Not difficult at all  PHQ9 TOTAL SCORE: 10  BONNIE 7 TOTAL SCORE: 14    Work stress has been increased greatly recently as she was told that she will be laid off in the near future. She has been a job for 7 years since has faced adversity with the staff since she started as apparently some of the staff wanted a different person in her position so they have tried to make her work life more stressful to the other person into the position. She was doing okay until she got a new supervisor who put this other person as her partner. The partner at her job treat her poorly. She has been looking for a different job for 2 years but the market in her position is limited. She has a strong rohit and has been relying on this to maintain hope.    Insomnia -  "patient has had chronic difficulty with sleeping at night and takes Restoril. She does not always sleep well even when taking this but it hasn't seemed to be the most effective medication of all of the things she has tried in the past. She knows that much of her insomnia is due to work stress and she is hoping to find a different job in the near future.    Problem list and histories reviewed & adjusted, as indicated.  Additional history: as documented    Labs reviewed in EPIC    ROS:  Constitutional, HEENT, cardiovascular, pulmonary, gi and gu systems are negative, except as otherwise noted.    OBJECTIVE:     /64 (BP Location: Left arm, Patient Position: Sitting, Cuff Size: Adult Regular)  Pulse 64  Temp 97.8  F (36.6  C) (Temporal)  Resp 16  Ht 5' 3.35\" (1.609 m)  Wt 166 lb 8 oz (75.5 kg)  BMI 29.17 kg/m2  Body mass index is 29.17 kg/(m^2).  GENERAL: healthy, alert and no distress  RESP: lungs clear to auscultation - no rales, rhonchi or wheezes  CV: regular rate and rhythm, normal S1 S2, no S3 or S4, no murmur, click or rub  MS: no gross musculoskeletal defects noted, no edema  PSYCH: mentation appears normal, affect normal/bright, anxious, judgement and insight intact and appearance well groomed    Diagnostic Test Results:  none     ASSESSMENT/PLAN:     1. Generalized anxiety disorder  Patient has had increased situational work stress but is continuing to cope with this through prayer. She states her symptoms are overall stable on her current medication. No dose changes. Refill sent.  - LORazepam (ATIVAN) 0.5 MG tablet; TAKE 1 TABLET BY MOUTH EVERY DAILY AS NEEDED  Dispense: 20 tablet; Refill: 0  - FLUoxetine (PROZAC) 20 MG capsule; Take 2 capsules (40 mg) by mouth daily  Dispense: 60 capsule; Refill: 5  - busPIRone (BUSPAR) 15 MG tablet; Take 1 tablet (15 mg) by mouth 2 times daily  Dispense: 60 tablet; Refill: 5    2. Insomnia, unspecified insomnia  Stable. Refill sent.  - temazepam (RESTORIL) 15 MG " capsule; TAKE ONE CAPSULE BY MOUTH NIGHTLY AS NEEDED  Dispense: 30 capsule; Refill: 3    [facility]

## 2018-04-27 ENCOUNTER — OFFICE VISIT (OUTPATIENT)
Dept: FAMILY MEDICINE | Facility: OTHER | Age: 43
End: 2018-04-27
Payer: COMMERCIAL

## 2018-04-27 VITALS
HEART RATE: 64 BPM | SYSTOLIC BLOOD PRESSURE: 106 MMHG | BODY MASS INDEX: 29.5 KG/M2 | TEMPERATURE: 97.8 F | RESPIRATION RATE: 16 BRPM | WEIGHT: 166.5 LBS | DIASTOLIC BLOOD PRESSURE: 64 MMHG | HEIGHT: 63 IN

## 2018-04-27 DIAGNOSIS — F41.1 GENERALIZED ANXIETY DISORDER: Primary | ICD-10-CM

## 2018-04-27 DIAGNOSIS — G47.00 INSOMNIA, UNSPECIFIED TYPE: ICD-10-CM

## 2018-04-27 PROCEDURE — 99214 OFFICE O/P EST MOD 30 MIN: CPT | Performed by: STUDENT IN AN ORGANIZED HEALTH CARE EDUCATION/TRAINING PROGRAM

## 2018-04-27 RX ORDER — LORAZEPAM 0.5 MG/1
TABLET ORAL
Qty: 20 TABLET | Refills: 0 | Status: SHIPPED | OUTPATIENT
Start: 2018-04-27 | End: 2018-08-30

## 2018-04-27 RX ORDER — BUSPIRONE HYDROCHLORIDE 15 MG/1
15 TABLET ORAL 2 TIMES DAILY
Qty: 60 TABLET | Refills: 5 | Status: SHIPPED | OUTPATIENT
Start: 2018-04-27 | End: 2018-10-08

## 2018-04-27 RX ORDER — TEMAZEPAM 15 MG/1
CAPSULE ORAL
Qty: 30 CAPSULE | Refills: 3 | Status: SHIPPED | OUTPATIENT
Start: 2018-04-27 | End: 2018-08-30

## 2018-04-27 ASSESSMENT — PATIENT HEALTH QUESTIONNAIRE - PHQ9
SUM OF ALL RESPONSES TO PHQ QUESTIONS 1-9: 10
10. IF YOU CHECKED OFF ANY PROBLEMS, HOW DIFFICULT HAVE THESE PROBLEMS MADE IT FOR YOU TO DO YOUR WORK, TAKE CARE OF THINGS AT HOME, OR GET ALONG WITH OTHER PEOPLE: NOT DIFFICULT AT ALL
SUM OF ALL RESPONSES TO PHQ QUESTIONS 1-9: 10

## 2018-04-27 ASSESSMENT — ANXIETY QUESTIONNAIRES
3. WORRYING TOO MUCH ABOUT DIFFERENT THINGS: SEVERAL DAYS
2. NOT BEING ABLE TO STOP OR CONTROL WORRYING: MORE THAN HALF THE DAYS
GAD7 TOTAL SCORE: 14
1. FEELING NERVOUS, ANXIOUS, OR ON EDGE: MORE THAN HALF THE DAYS
GAD7 TOTAL SCORE: 14
7. FEELING AFRAID AS IF SOMETHING AWFUL MIGHT HAPPEN: NEARLY EVERY DAY
5. BEING SO RESTLESS THAT IT IS HARD TO SIT STILL: SEVERAL DAYS
GAD7 TOTAL SCORE: 14
4. TROUBLE RELAXING: NEARLY EVERY DAY
7. FEELING AFRAID AS IF SOMETHING AWFUL MIGHT HAPPEN: NEARLY EVERY DAY
6. BECOMING EASILY ANNOYED OR IRRITABLE: MORE THAN HALF THE DAYS

## 2018-04-27 NOTE — MR AVS SNAPSHOT
"              After Visit Summary   4/27/2018    Deo Hargrove    MRN: [id number]           Patient Information     Date Of Birth          1975        Visit Information        Provider Department      4/27/2018 3:20 PM Rosalinda Ho APRN CNP [facility]        Today's Diagnoses     Generalized anxiety disorder    -  1    Insomnia, unspecified insomnia           Follow-ups after your visit        Who to contact     If you have questions or need follow up information about today's clinic visit or your schedule please contact [facility] directly at [phone].  Normal or non-critical lab and imaging results will be communicated to you by Diplopiahart, letter or phone within 4 business days after the clinic has received the results. If you do not hear from us within 7 days, please contact the clinic through Diplopiahart or phone. If you have a critical or abnormal lab result, we will notify you by phone as soon as possible.  Submit refill requests through [email or web address] or call your pharmacy and they will forward the refill request to us. Please allow 3 business days for your refill to be completed.          Additional Information About Your Visit        MyChart Information     [email or web address] gives you secure access to your electronic health record. If you see a primary care provider, you can also send messages to your care team and make appointments. If you have questions, please call your primary care clinic.  If you do not have a primary care provider, please call [phone] and they will assist you.        Care EveryWhere ID     This is your Care EveryWhere ID. This could be used by other organizations to access your [place] medical records  [id number]        Your Vitals Were     Pulse Temperature Respirations Height BMI (Body Mass Index)       64 97.8  F (36.6  C) (Temporal) 16 5' 3.35\" (1.609 m) 29.17 kg/m2        Blood Pressure from Last 3 Encounters:   04/27/18 106/64 "   11/03/17 108/74   08/15/17 90/66    Weight from Last 3 Encounters:   04/27/18 166 lb 8 oz (75.5 kg)   12/18/17 162 lb (73.5 kg)   11/13/17 162 lb (73.5 kg)              Today, you had the following     No orders found for display         Today's Medication Changes          These changes are accurate as of 4/27/18  5:15 PM.  If you have any questions, ask your nurse or doctor.               These medicines have changed or have updated prescriptions.        Dose/Directions    LORazepam 0.5 MG tablet   Commonly known as:  ATIVAN   This may have changed:  See the new instructions.   Used for:  Generalized anxiety disorder   Changed by:  Rosalinda Ho APRN CNP        TAKE 1 TABLET BY MOUTH EVERY DAILY AS NEEDED   Quantity:  20 tablet   Refills:  0            Where to get your medicines      These medications were sent to [facility] [address] [place] [address] [facility] AT [facility] [address]  [address] [facility], [facility] [address]     Phone:  [phone]     busPIRone 15 MG tablet    FLUoxetine 20 MG capsule         Some of these will need a paper prescription and others can be bought over the counter.  Ask your nurse if you have questions.     Bring a paper prescription for each of these medications     LORazepam 0.5 MG tablet    temazepam 15 MG capsule                Primary Care Provider Office Phone # Fax #    LARRY Rdz [facility] [phone] [address] [facility] [address]        Equal Access to Services     [facility] DEBORAH AH: Hadii aad ku hadasho Soomaali, waaxda luqadaha, qaybta kaalmada adeegyada, waxay mara cooper . So [facility] [phone].    ATENCIÓN: Si habla español, tiene a castañeda disposición servicios gratuitos de asistencia lingüística. Llame al [phone].    We comply with applicable federal civil rights laws and [place] laws. We do not discriminate on the basis of race, color, national origin, age,  disability, sex, sexual orientation, or gender identity.            Thank you!     Thank you for choosing [facility]  for your care. Our goal is always to provide you with excellent care. Hearing back from our patients is one way we can continue to improve our services. Please take a few minutes to complete the written survey that you may receive in the mail after your visit with us. Thank you!             Your Updated Medication List - Protect others around you: Learn how to safely use, store and throw away your medicines at www.disposemymeds.org.          This list is accurate as of 4/27/18  5:15 PM.  Always use your most recent med list.                   Brand Name Dispense Instructions for use Diagnosis    busPIRone 15 MG tablet    BUSPAR    60 tablet    Take 1 tablet (15 mg) by mouth 2 times daily    Generalized anxiety disorder       * carBAMazepine 50 mg chewable half-tab    TEGretol     Take 100 mg by mouth 2 times daily        * carBAMazepine 200 MG 12 hr capsule    CARBATROL     Take 200 mg by mouth 2 times daily        diclofenac 75 MG EC tablet    VOLTAREN    60 tablet    Take 1 tablet (75 mg) by mouth 2 times daily    Plantar fascial fibromatosis, Achilles tendinitis of right lower extremity       FLUoxetine 20 MG capsule    PROzac    60 capsule    Take 2 capsules (40 mg) by mouth daily    Generalized anxiety disorder       LORazepam 0.5 MG tablet    ATIVAN    20 tablet    TAKE 1 TABLET BY MOUTH EVERY DAILY AS NEEDED    Generalized anxiety disorder       temazepam 15 MG capsule    RESTORIL    30 capsule    TAKE ONE CAPSULE BY MOUTH NIGHTLY AS NEEDED    Insomnia, unspecified type       topiramate 100 MG tablet    TOPAMAX    60 tablet    Take 1 tablet (100 mg) by mouth 2 times daily Prescribed by Neurologist        traMADol 50 MG tablet    ULTRAM     TK 1 T PO  AT THE ONSET OF MIGRAINE. CAN REPEAT ONCE AFTER 2 HOURS IF NEEDED        * Notice:  This list has 2 medication(s) that are the  same as other medications prescribed for you. Read the directions carefully, and ask your doctor or other care provider to review them with you.

## 2018-04-28 ASSESSMENT — ANXIETY QUESTIONNAIRES: GAD7 TOTAL SCORE: 14

## 2018-04-28 ASSESSMENT — PATIENT HEALTH QUESTIONNAIRE - PHQ9: SUM OF ALL RESPONSES TO PHQ QUESTIONS 1-9: 10

## 2018-05-29 DIAGNOSIS — F41.1 GENERALIZED ANXIETY DISORDER: ICD-10-CM

## 2018-05-31 NOTE — TELEPHONE ENCOUNTER
"Requested Prescriptions   Pending Prescriptions Disp Refills     FLUoxetine (PROZAC) 20 MG capsule 60 capsule 5     Sig: Take 2 capsules (40 mg) by mouth daily    SSRIs Protocol Passed    5/29/2018  1:59 PM       Passed - Recent (12 mo) or future (30 days) visit within the authorizing provider's specialty    Patient had office visit in the last 12 months or has a visit in the next 30 days with authorizing provider or within the authorizing provider's specialty.  See \"Patient Info\" tab in inbasket, or \"Choose Columns\" in Meds & Orders section of the refill encounter.           Passed - Patient is age 18 or older       Passed - No active pregnancy on record       Passed - No positive pregnancy test in last 12 months        Was sent refills on 04/27/2018 should not be out.    Michoacano Kelley, RN, BSN        "

## 2018-06-04 DIAGNOSIS — F41.1 GENERALIZED ANXIETY DISORDER: ICD-10-CM

## 2018-06-05 NOTE — TELEPHONE ENCOUNTER
"Requested Prescriptions   Pending Prescriptions Disp Refills     FLUoxetine (PROZAC) 20 MG capsule 60 capsule 5     Sig: Take 2 capsules (40 mg) by mouth daily    SSRIs Protocol Passed    6/4/2018  7:52 AM       Passed - Recent (12 mo) or future (30 days) visit within the authorizing provider's specialty    Patient had office visit in the last 12 months or has a visit in the next 30 days with authorizing provider or within the authorizing provider's specialty.  See \"Patient Info\" tab in inbasket, or \"Choose Columns\" in Meds & Orders section of the refill encounter.           Passed - Patient is age 18 or older       Passed - No active pregnancy on record       Passed - No positive pregnancy test in last 12 months        Last ov 04/27/2018 this was sent at last ov.    Michoacano Kelley, RN, BSN        "

## 2018-07-09 ENCOUNTER — ALLIED HEALTH/NURSE VISIT (OUTPATIENT)
Dept: FAMILY MEDICINE | Facility: OTHER | Age: 43
End: 2018-07-09
Payer: COMMERCIAL

## 2018-07-09 DIAGNOSIS — Z11.1 SCREENING EXAMINATION FOR PULMONARY TUBERCULOSIS: Primary | ICD-10-CM

## 2018-07-09 PROCEDURE — 99207 ZZC NO CHARGE NURSE ONLY: CPT

## 2018-07-09 PROCEDURE — 86580 TB INTRADERMAL TEST: CPT

## 2018-07-09 NOTE — MR AVS SNAPSHOT
After Visit Summary   7/9/2018    Deo Hargrove    MRN: [id number]           Patient Information     Date Of Birth          1975        Visit Information        Provider Department      7/9/2018 4:00 PM NL FLOALETA NURSE [facility]3]        Today's Diagnoses     Screening examination for pulmonary tuberculosis    -  1       Follow-ups after your visit        Your next 10 appointments already scheduled     Jul 12, 2018  3:00 PM CDT   Nurse Only with NL RN [facility]3] ([facility])    [address] [facility] [address] [phone]              Who to contact     If you have questions or need follow up information about today's clinic visit or your schedule please contact [facility] directly at [phone].  Normal or non-critical lab and imaging results will be communicated to you by Accordhart, letter or phone within 4 business days after the clinic has received the results. If you do not hear from us within 7 days, please contact the clinic through Accordhart or phone. If you have a critical or abnormal lab result, we will notify you by phone as soon as possible.  Submit refill requests through Liquid Scenarios or call your pharmacy and they will forward the refill request to us. Please allow 3 business days for your refill to be completed.          Additional Information About Your Visit        MyChart Information     Liquid Scenarios gives you secure access to your electronic health record. If you see a primary care provider, you can also send messages to your care team and make appointments. If you have questions, please call your primary care clinic.  If you do not have a primary care provider, please call [phone] and they will assist you.        Care EveryWhere ID     This is your Care EveryWhere ID. This could be used by other organizations to access your [place] medical records  [id number]         Blood Pressure from Last 3  Encounters:   04/27/18 106/64   11/03/17 108/74   08/15/17 90/66    Weight from Last 3 Encounters:   04/27/18 166 lb 8 oz (75.5 kg)   12/18/17 162 lb (73.5 kg)   11/13/17 162 lb (73.5 kg)              We Performed the Following     TB INTRADERMAL TEST        Primary Care Provider Office Phone # Fax LARRY [facility]greg[facility] [phone]       [address] [address] [facility] [address]        Equal Access to Services     [facility]: Hadii aad ku hadasho Soomaali, waaxda luqadaha, qaybta kaalmada adeegyada, olive cooper . So [facility] [phone].    ATENCIÓN: Si habla español, tiene a castañeda disposición servicios gratuitos de asistencia lingüística. [facility] [phone].    We comply with applicable federal civil rights laws and [place] laws. We do not discriminate on the basis of race, color, national origin, age, disability, sex, sexual orientation, or gender identity.            Thank you!     Thank you for choosing [facility]  for your care. Our goal is always to provide you with excellent care. Hearing back from our patients is one way we can continue to improve our services. Please take a few minutes to complete the written survey that you may receive in the mail after your visit with us. Thank you!             Your Updated Medication List - Protect others around you: Learn how to safely use, store and throw away your medicines at www.disposemymeds.org.          This list is accurate as of 7/9/18  5:42 PM.  Always use your most recent med list.                   Brand Name Dispense Instructions for use Diagnosis    busPIRone 15 MG tablet    BUSPAR    60 tablet    Take 1 tablet (15 mg) by mouth 2 times daily    Generalized anxiety disorder       * carBAMazepine 50 mg chewable half-tab    TEGretol     Take 100 mg by mouth 2 times daily        * carBAMazepine 200 MG 12 hr capsule    CARBATROL     Take 200 mg by mouth 2 times daily        diclofenac 75  MG EC tablet    VOLTAREN    60 tablet    Take 1 tablet (75 mg) by mouth 2 times daily    Plantar fascial fibromatosis, Achilles tendinitis of right lower extremity       FLUoxetine 20 MG capsule    PROzac    60 capsule    Take 2 capsules (40 mg) by mouth daily    Generalized anxiety disorder       LORazepam 0.5 MG tablet    ATIVAN    20 tablet    TAKE 1 TABLET BY MOUTH EVERY DAILY AS NEEDED    Generalized anxiety disorder       temazepam 15 MG capsule    RESTORIL    30 capsule    TAKE ONE CAPSULE BY MOUTH NIGHTLY AS NEEDED    Insomnia, unspecified type       topiramate 100 MG tablet    TOPAMAX    60 tablet    Take 1 tablet (100 mg) by mouth 2 times daily Prescribed by Neurologist        traMADol 50 MG tablet    ULTRAM     TK 1 T PO  AT THE ONSET OF MIGRAINE. CAN REPEAT ONCE AFTER 2 HOURS IF NEEDED        * Notice:  This list has 2 medication(s) that are the same as other medications prescribed for you. Read the directions carefully, and ask your doctor or other care provider to review them with you.

## 2018-07-09 NOTE — NURSING NOTE
The patient is asked the following questions today and these are her answers:    -Have you had a mantoux administered in the past 30 days?    No  -Have you had a previous positive Mantoux.  No  -Have you received BCG in the past.  No  -Have you had a live vaccine  (MMR, Varicella, OPV, Yellow Fever) in the last 6 weeks.  No  -Have you had and active  viral or bacterial infection in the past 6 weeks.  No  -Have you received corticosteroids or immunosuppressive agents in the past 6 weeks.  No  -Have you been diagnosed with HIV?  No  -Do you have a maglinancy?  No   Mona Ackerman CMA

## 2018-07-10 ENCOUNTER — TELEPHONE (OUTPATIENT)
Dept: FAMILY MEDICINE | Facility: OTHER | Age: 43
End: 2018-07-10

## 2018-07-10 NOTE — TELEPHONE ENCOUNTER
Left message for patient to return call. Please see if it would be more convenient for patient to go to [place] to have mantoux read at 4pm due to her schedule.

## 2018-07-11 ENCOUNTER — ALLIED HEALTH/NURSE VISIT (OUTPATIENT)
Dept: FAMILY MEDICINE | Facility: OTHER | Age: 43
End: 2018-07-11
Payer: COMMERCIAL

## 2018-07-11 DIAGNOSIS — Z11.1 VISIT FOR MANTOUX TEST: Primary | ICD-10-CM

## 2018-07-11 LAB
PPDINDURATION: 0 MM (ref 0–5)
PPDREDNESS: 0 MM

## 2018-07-11 PROCEDURE — 99207 ZZC NO CHARGE NURSE ONLY: CPT

## 2018-07-11 NOTE — MR AVS SNAPSHOT
After Visit Summary   7/11/2018    Deo Hargrove    MRN: [id number]           Patient Information     Date Of Birth          1975        Visit Information        Provider Department      7/11/2018 4:00 PM NL RN TEAM A, ERC [facility]        Today's Diagnoses     Visit for Mantoux test    -  1       Follow-ups after your visit        Your next 10 appointments already scheduled     Jul 23, 2018  4:15 PM CDT   Nurse Only with NL FLOAT TEAM B, [facility] ([facility])    [address] [address] [phone]            Jul 26, 2018  4:00 PM CDT   Nurse Only with NL RN TEAM A, ERC   [facility] ([facility])    [address] [id number] [phone]              Who to contact     If you have questions or need follow up information about today's clinic visit or your schedule please contact [facility] directly at [phone].  Normal or non-critical lab and imaging results will be communicated to you by Edison Pharmaceuticalshart, letter or phone within 4 business days after the clinic has received the results. If you do not hear from us within 7 days, please contact the clinic through [employer] or phone. If you have a critical or abnormal lab result, we will notify you by phone as soon as possible.  Submit refill requests through Pango or call your pharmacy and they will forward the refill request to us. Please allow 3 business days for your refill to be completed.          Additional Information About Your Visit        Ed[employer] Pharmaceuticalshart Information     Pango gives you secure access to your electronic health record. If you see a primary care provider, you can also send messages to your care team and make appointments. If you have questions, please call your primary care clinic.  If you do not have a primary care provider, please call [phone] and they will assist you.         Care EveryWhere ID     This is your Care EveryWhere ID. This could be used by other organizations to access your [place] medical records  [id number]         Blood Pressure from Last 3 Encounters:   04/27/18 106/64   11/03/17 108/74   08/15/17 90/66    Weight from Last 3 Encounters:   04/27/18 166 lb 8 oz (75.5 kg)   12/18/17 162 lb (73.5 kg)   11/13/17 162 lb (73.5 kg)              Today, you had the following     No orders found for display       Primary Care Provider Office Phone # Fax #    Rosalinda Ho, LARRY [facility] [phone] [address] [address] [place] [address]        Equal Access to Services     JENNA CABRERA : Hadii aad ku hadasho Soomaali, waaxda luqadaha, qaybta kaalmada adeegyada, waxay idiin haykarissa cooper . So [facility] [phone].    ATENCIÓN: Si habla español, tiene a castañeda disposición servicios gratuitos de asistencia lingüística. Llame al [phone].    We comply with applicable federal civil rights laws and [place] laws. We do not discriminate on the basis of race, color, national origin, age, disability, sex, sexual orientation, or gender identity.            Thank you!     Thank you for choosing [facility]  for your care. Our goal is always to provide you with excellent care. Hearing back from our patients is one way we can continue to improve our services. Please take a few minutes to complete the written survey that you may receive in the mail after your visit with us. Thank you!             Your Updated Medication List - Protect others around you: Learn how to safely use, store and throw away your medicines at www.disposemymeds.org.          This list is accurate as of 7/11/18  4:28 PM.  Always use your most recent med list.                   Brand Name Dispense Instructions for use Diagnosis    busPIRone 15 MG tablet    BUSPAR    60 tablet    Take 1 tablet (15 mg) by mouth 2 times daily    Generalized anxiety disorder       *  carBAMazepine 50 mg chewable half-tab    TEGretol     Take 100 mg by mouth 2 times daily        * carBAMazepine 200 MG 12 hr capsule    CARBATROL     Take 200 mg by mouth 2 times daily        diclofenac 75 MG EC tablet    VOLTAREN    60 tablet    Take 1 tablet (75 mg) by mouth 2 times daily    Plantar fascial fibromatosis, Achilles tendinitis of right lower extremity       FLUoxetine 20 MG capsule    PROzac    60 capsule    Take 2 capsules (40 mg) by mouth daily    Generalized anxiety disorder       LORazepam 0.5 MG tablet    ATIVAN    20 tablet    TAKE 1 TABLET BY MOUTH EVERY DAILY AS NEEDED    Generalized anxiety disorder       temazepam 15 MG capsule    RESTORIL    30 capsule    TAKE ONE CAPSULE BY MOUTH NIGHTLY AS NEEDED    Insomnia, unspecified type       topiramate 100 MG tablet    TOPAMAX    60 tablet    Take 1 tablet (100 mg) by mouth 2 times daily Prescribed by Neurologist        traMADol 50 MG tablet    ULTRAM     TK 1 T PO  AT THE ONSET OF MIGRAINE. CAN REPEAT ONCE AFTER 2 HOURS IF NEEDED        * Notice:  This list has 2 medication(s) that are the same as other medications prescribed for you. Read the directions carefully, and ask your doctor or other care provider to review them with you.

## 2018-07-11 NOTE — LETTER
[address]  [facility] [address]  [phone]          7/11/2018          To Whom it May Concern:     Deo Hargrove, female, 1975 has had a mantoux on 7/9/18.      Mantoux result is NEGATIVE:  Lab Results   Component Value Date    PPDREDNESS 0 07/11/2018    PPDINDURATIO 0 07/11/2018         Please contact me for questions or concerns.        Sincerely,      Nelsy Weiss RN

## 2018-07-11 NOTE — PROGRESS NOTES
Deo Hargrove is here for Mantoux read.  Mantoux was place on 7/9/18 at 4:15pm time on right forearm.    0mm induration  0mm redness    Letter generated, printed and signed.    Nelsy Weiss RN

## 2018-07-23 ENCOUNTER — ALLIED HEALTH/NURSE VISIT (OUTPATIENT)
Dept: FAMILY MEDICINE | Facility: OTHER | Age: 43
End: 2018-07-23
Payer: COMMERCIAL

## 2018-07-23 DIAGNOSIS — Z11.1 SCREENING EXAMINATION FOR PULMONARY TUBERCULOSIS: Primary | ICD-10-CM

## 2018-07-23 PROCEDURE — 99207 ZZC NO CHARGE NURSE ONLY: CPT

## 2018-07-23 PROCEDURE — 86580 TB INTRADERMAL TEST: CPT

## 2018-07-23 NOTE — MR AVS SNAPSHOT
After Visit Summary   7/23/2018    Deo Hargrove    MRN: [id number]           Patient Information     Date Of Birth          1975        Visit Information        Provider Department      7/23/2018 4:15 PM NL FLOAT TEAM B, [facility]        Today's Diagnoses     Screening examination for pulmonary tuberculosis    -  1       Follow-ups after your visit        Your next 10 appointments already scheduled     Jul 23, 2018  4:15 PM CDT   Nurse [facility] with NL [facility]    [address] [address] [phone]            Jul 26, 2018  4:00 PM CDT   Nurse Only with NL RN TEAM A, [facility] ([facility])    [address] [id number] [phone]              Who to contact     If you have questions or need follow up information about today's clinic visit or your schedule please contact [facility] directly at [phone].  Normal or non-critical lab and imaging results will be communicated to you by Bootstrap Softwarehart, letter or phone within 4 business days after the clinic has received the results. If you do not hear from us within 7 days, please contact the clinic through MyCleant or phone. If you have a critical or abnormal lab result, we will notify you by phone as soon as possible.  Submit refill requests through ClearLine Mobile or call your pharmacy and they will forward the refill request to us. Please allow 3 business days for your refill to be completed.          Additional Information About Your Visit        Bootstrap Softwarehart Information     ClearLine Mobile gives you secure access to your electronic health record. If you see a primary care provider, you can also send messages to your care team and make appointments. If you have questions, please call your primary care clinic.  If you do not have a primary care provider, please call [phone] and  they will assist you.        Care EveryWhere ID     This is your Care EveryWhere ID. This could be used by other organizations to access your [place] medical records  [id number]         Blood Pressure from Last 3 Encounters:   04/27/18 106/64   11/03/17 108/74   08/15/17 90/66    Weight from Last 3 Encounters:   04/27/18 166 lb 8 oz (75.5 kg)   12/18/17 162 lb (73.5 kg)   11/13/17 162 lb (73.5 kg)              We Performed the Following     INJECTION INTRAMUSCULAR OR SUB-Q     TB INTRADERMAL TEST        Primary Care Provider Office Phone # Fax #    Rosalinda Ho, APRN [facility] [phone]       [address] [address] [place] [address]        Equal Access to Services     JENNA CABRERA : Hadii hoang gomez hadasho Soomaali, waaxda luqadaha, qaybta kaalmada adeegyada, waxmarie cooper . So [facility] [phone].    ATENCIÓN: Si habla español, tiene a castañeda disposición servicios gratuitos de asistencia lingüística. Llame al [phone].    We comply with applicable federal civil rights laws and [place] laws. We do not discriminate on the basis of race, color, national origin, age, disability, sex, sexual orientation, or gender identity.            Thank you!     Thank you for choosing [facility]  for your care. Our goal is always to provide you with excellent care. Hearing back from our patients is one way we can continue to improve our services. Please take a few minutes to complete the written survey that you may receive in the mail after your visit with us. Thank you!             Your Updated Medication List - Protect others around you: Learn how to safely use, store and throw away your medicines at www.disposemymeds.org.          This list is accurate as of 7/23/18  4:08 PM.  Always use your most recent med list.                   Brand Name Dispense Instructions for use Diagnosis    busPIRone 15 MG tablet    BUSPAR    60 tablet    Take 1 tablet (15 mg) by mouth 2  times daily    Generalized anxiety disorder       * carBAMazepine 50 mg chewable half-tab    TEGretol     Take 100 mg by mouth 2 times daily        * carBAMazepine 200 MG 12 hr capsule    CARBATROL     Take 200 mg by mouth 2 times daily        diclofenac 75 MG EC tablet    VOLTAREN    60 tablet    Take 1 tablet (75 mg) by mouth 2 times daily    Plantar fascial fibromatosis, Achilles tendinitis of right lower extremity       FLUoxetine 20 MG capsule    PROzac    60 capsule    Take 2 capsules (40 mg) by mouth daily    Generalized anxiety disorder       LORazepam 0.5 MG tablet    ATIVAN    20 tablet    TAKE 1 TABLET BY MOUTH EVERY DAILY AS NEEDED    Generalized anxiety disorder       temazepam 15 MG capsule    RESTORIL    30 capsule    TAKE ONE CAPSULE BY MOUTH NIGHTLY AS NEEDED    Insomnia, unspecified type       topiramate 100 MG tablet    TOPAMAX    60 tablet    Take 1 tablet (100 mg) by mouth 2 times daily Prescribed by Neurologist        traMADol 50 MG tablet    ULTRAM     TK 1 T PO  AT THE ONSET OF MIGRAINE. CAN REPEAT ONCE AFTER 2 HOURS IF NEEDED        * Notice:  This list has 2 medication(s) that are the same as other medications prescribed for you. Read the directions carefully, and ask your doctor or other care provider to review them with you.

## 2018-07-23 NOTE — PROGRESS NOTES
The patient is asked the following questions today and these are her answers:    -Have you had a mantoux administered in the past 30 days?    YES - 2 step mantoux  -Have you had a previous positive Mantoux.  No  -Have you received BCG in the past.  No  -Have you had a live vaccine  (MMR, Varicella, OPV, Yellow Fever) in the last 6 weeks.  No  -Have you had and active  viral or bacterial infection in the past 6 weeks.  No  -Have you received corticosteroids or immunosuppressive agents in the past 6 weeks.  No  -Have you been diagnosed with HIV?  No  -Do you have a maglinancy?  No     Prior to injection verified patient identity using patient's name and date of birth. Chery Vigil, CMA

## 2018-07-24 DIAGNOSIS — M72.2 PLANTAR FASCIAL FIBROMATOSIS: ICD-10-CM

## 2018-07-24 DIAGNOSIS — M76.61 ACHILLES TENDINITIS OF RIGHT LOWER EXTREMITY: ICD-10-CM

## 2018-07-24 RX ORDER — DICLOFENAC SODIUM 75 MG/1
75 TABLET, DELAYED RELEASE ORAL 2 TIMES DAILY
Qty: 60 TABLET | Refills: 1 | Status: SHIPPED | OUTPATIENT
Start: 2018-07-24 | End: 2019-01-21

## 2018-07-26 ENCOUNTER — ALLIED HEALTH/NURSE VISIT (OUTPATIENT)
Dept: FAMILY MEDICINE | Facility: OTHER | Age: 43
End: 2018-07-26
Payer: COMMERCIAL

## 2018-07-26 DIAGNOSIS — Z11.1 SCREENING EXAMINATION FOR PULMONARY TUBERCULOSIS: Primary | ICD-10-CM

## 2018-07-26 LAB
PPDINDURATION: 0 MM (ref 0–5)
PPDREDNESS: 0 MM

## 2018-07-26 PROCEDURE — 99207 ZZC NO CHARGE NURSE ONLY: CPT

## 2018-07-26 NOTE — LETTER
[address]  [facility] [address]  [phone]          7/26/2018          To Whom it May Concern:     Deo Hargrove, female, 1975 has had a mantoux on 7/23/18      Mantoux result is NEGATIVE:  Lab Results   Component Value Date    PPDREDNESS 0 07/26/2018    PPDINDURATIO 0 07/26/2018         Please contact me for questions or concerns.        Sincerely,      Paige Ho, AIMEE Galdamez, RN, BSN

## 2018-07-26 NOTE — MR AVS SNAPSHOT
After Visit Summary   7/26/2018    Deo Hargrove    MRN: [id number]           Patient Information     Date Of Birth          1975        Visit Information        Provider Department      7/26/2018 4:00 PM NL RN TEAM A, [facility]        Today's Diagnoses     Screening examination for pulmonary tuberculosis    -  1       Follow-ups after your visit        Your next 10 appointments already scheduled     Jul 26, 2018  4:00 PM CDT   Nurse Only with NL RN TEAM A, [facility]   [facility] ([facility]    [address] [facility] [address]  [facility] [id number] [phone]              Who to contact     If you have questions or need follow up information about today's clinic visit or your schedule please contact [facility] directly at [phone].  Normal or non-critical lab and imaging results will be communicated to you by CareLinxhart, letter or phone within 4 business days after the clinic has received the results. If you do not hear from us within 7 days, please contact the clinic through CareLinxhart or phone. If you have a critical or abnormal lab result, we will notify you by phone as soon as possible.  Submit refill requests through Voice123 or call your pharmacy and they will forward the refill request to us. Please allow 3 business days for your refill to be completed.          Additional Information About Your Visit        MyChart Information     Voice123 gives you secure access to your electronic health record. If you see a primary care provider, you can also send messages to your care team and make appointments. If you have questions, please call your primary care clinic.  If you do not have a primary care provider, please call [phone] and they will assist you.        Care EveryWhere ID     This is your Care EveryWhere ID. This could be used by other organizations to access your [place] medical records  [id number]         Blood Pressure  from Last 3 Encounters:   04/27/18 106/64   11/03/17 108/74   08/15/17 90/66    Weight from Last 3 Encounters:   04/27/18 166 lb 8 oz (75.5 kg)   12/18/17 162 lb (73.5 kg)   11/13/17 162 lb (73.5 kg)              Today, you had the following     No orders found for display       Primary Care Provider Office Phone # Fax #    Rosalinda Ho, LARRY [facility] [phone] [address] [facility] [address]        Equal Access to Services     [facility]: Hadii hoang ku hadasho Soomaali, waaxda luqadaha, qaybta kaalmada adeegyada, olive cooper . So [facility] [phone].    ATENCIÓN: Si habla español, tiene a castañeda disposición servicios gratuitos de asistencia lingüística. Ll[facility] [phone].    We comply with applicable federal civil rights laws and [place] laws. We do not discriminate on the basis of race, color, national origin, age, disability, sex, sexual orientation, or gender identity.            Thank you!     Thank you for choosing [facility]  for your care. Our goal is always to provide you with excellent care. Hearing back from our patients is one way we can continue to improve our services. Please take a few minutes to complete the written survey that you may receive in the mail after your visit with us. Thank you!             Your Updated Medication List - Protect others around you: Learn how to safely use, store and throw away your medicines at www.disposemymeds.org.          This list is accurate as of 7/26/18  2:49 PM.  Always use your most recent med list.                   Brand Name Dispense Instructions for use Diagnosis    busPIRone 15 MG tablet    BUSPAR    60 tablet    Take 1 tablet (15 mg) by mouth 2 times daily    Generalized anxiety disorder       * carBAMazepine 50 mg chewable half-tab    TEGretol     Take 100 mg by mouth 2 times daily        * carBAMazepine 200 MG 12 hr capsule    CARBATROL     Take 200 mg by mouth 2 times  daily        diclofenac 75 MG EC tablet    VOLTAREN    60 tablet    Take 1 tablet (75 mg) by mouth 2 times daily    Plantar fascial fibromatosis, Achilles tendinitis of right lower extremity       FLUoxetine 20 MG capsule    PROzac    60 capsule    Take 2 capsules (40 mg) by mouth daily    Generalized anxiety disorder       LORazepam 0.5 MG tablet    ATIVAN    20 tablet    TAKE 1 TABLET BY MOUTH EVERY DAILY AS NEEDED    Generalized anxiety disorder       temazepam 15 MG capsule    RESTORIL    30 capsule    TAKE ONE CAPSULE BY MOUTH NIGHTLY AS NEEDED    Insomnia, unspecified type       topiramate 100 MG tablet    TOPAMAX    60 tablet    Take 1 tablet (100 mg) by mouth 2 times daily Prescribed by Neurologist        traMADol 50 MG tablet    ULTRAM     TK 1 T PO  AT THE ONSET OF MIGRAINE. CAN REPEAT ONCE AFTER 2 HOURS IF NEEDED        * Notice:  This list has 2 medication(s) that are the same as other medications prescribed for you. Read the directions carefully, and ask your doctor or other care provider to review them with you.

## 2018-07-26 NOTE — PROGRESS NOTES
Mantoux result:  Lab Results   Component Value Date    PPDREDNESS 0 07/26/2018    PPDINDURATIO 0 07/26/2018     Marlyn Galdamez, RN, BSN

## 2018-08-30 ENCOUNTER — TELEPHONE (OUTPATIENT)
Dept: FAMILY MEDICINE | Facility: OTHER | Age: 43
End: 2018-08-30

## 2018-08-30 DIAGNOSIS — F41.1 GENERALIZED ANXIETY DISORDER: ICD-10-CM

## 2018-08-30 DIAGNOSIS — G47.00 INSOMNIA, UNSPECIFIED TYPE: ICD-10-CM

## 2018-08-30 NOTE — TELEPHONE ENCOUNTER
Temazepam      Last Written Prescription Date:  4/27/2018  Last Fill Quantity: 30,   # refills: 3  Last Office Visit: 4/27/2018  Future Office visit:       Routing refill request to provider for review/approval because:  Drug not on the [facility], [facility] or [facility] refill protocol or controlled substance    Lorazepam      Last Written Prescription Date:  4/27/2018  Last Fill Quantity: 20,   # refills: 0  Last Office Visit: 4/27/2018  Future Office visit:       Routing refill request to provider for review/approval because:  Drug not on the [facility], [facility] or [facility] refill protocol or controlled substance    Sigrid Quiñones, RN, BSN

## 2018-09-05 RX ORDER — LORAZEPAM 0.5 MG/1
TABLET ORAL
Qty: 20 TABLET | Refills: 0 | Status: SHIPPED | OUTPATIENT
Start: 2018-09-05 | End: 2018-10-29

## 2018-09-05 RX ORDER — TEMAZEPAM 15 MG/1
CAPSULE ORAL
Qty: 30 CAPSULE | Refills: 0 | Status: SHIPPED | OUTPATIENT
Start: 2018-09-05 | End: 2019-07-09 | Stop reason: DRUGHIGH

## 2018-10-05 DIAGNOSIS — G47.00 INSOMNIA, UNSPECIFIED TYPE: ICD-10-CM

## 2018-10-05 RX ORDER — TEMAZEPAM 15 MG/1
CAPSULE ORAL
Qty: 30 CAPSULE | Refills: 0 | Status: CANCELLED | OUTPATIENT
Start: 2018-10-05

## 2018-10-05 NOTE — TELEPHONE ENCOUNTER
Pending Prescriptions:                       Disp   Refills    temazepam (RESTORIL) 15 MG capsule        30 cap*0            Sig: TAKE ONE CAPSULE BY MOUTH NIGHTLY AS NEEDED    Last OV 04/27/2018  Last filled 09/05/2018      Routing refill request to provider for review/approval because:  Drug not on the [facility], [facility] or [facility] refill protocol or controlled substance

## 2018-10-08 RX ORDER — TEMAZEPAM 15 MG/1
CAPSULE ORAL
Qty: 30 CAPSULE | Refills: 0 | Status: CANCELLED | OUTPATIENT
Start: 2018-10-08

## 2018-10-08 NOTE — PROGRESS NOTES
"  SUBJECTIVE:   Deo Hargrove is a 43 year old female who presents to clinic today for the following health issues:    Pt is here today to refill Restoril. She is getting about 8 hours of sleep per night. If she does not take Restoril she will have difficulty falling asleep and staying asleep. She would like to taper off the Restoril over the next several months and is wondering if she can have the option for a lower dose to do this.     She is also concerned about having bouts where she feels like her entire body becomes extremely tired to the point that she has to pull over to close her eyes or even take a short nap. She will have rested well the night before and it is not related to any increases in stress. She reports \"it is not like a tired you feel when you haven't slept enough, it is different and hard to explain. It is like every muscle in my body becomes so tired that I can't go on without taking a short nap\". She is seen by neurology for migraines and takes topiramate and carbamazepine. She has had no changes in medications over the past 6 months. Two months ago she started 2 new jobs but the episodes started before this and the job change was a positive one. She is overall happier. She has decreased her dose of Prozac to 20 mg daily and is doing well in terms of her mood.       History of Present Illness     Depression & Anxiety Follow-up:     Depression/Anxiety:  Anxiety only    Status since last visit::  Improved    Other associated symptoms of anxiety::  None    Significant life event::  No    Current substance use::  None    Depression symptoms::  None       Today's PHQ-9         PHQ-9 Total Score:     (P) 5   PHQ-9 Q9 Suicidal ideation:   (P) Not at all   Thoughts of suicide or self harm:      Self-harm Plan:        Self-harm Action:          Safety concerns for self or others:       BONNIE-7 Total Score: (P) 1    Diet:  Regular (no restrictions)  Frequency of exercise:  1 day/week  Duration of " exercise:  15-30 minutes  Taking medications regularly:  Yes  Medication side effects:  None  Additional concerns today:  No    Problem list and histories reviewed & adjusted, as indicated.  Additional history: as documented    BP Readings from Last 3 Encounters:   10/09/18 108/72   04/27/18 106/64   11/03/17 108/74    Wt Readings from Last 3 Encounters:   04/27/18 166 lb 8 oz (75.5 kg)   12/18/17 162 lb (73.5 kg)   11/13/17 162 lb (73.5 kg)                  Labs reviewed in EPIC    ROS:  Constitutional, HEENT, cardiovascular, pulmonary, gi and gu systems are negative, except as otherwise noted.    OBJECTIVE:     /72  Pulse 62  Temp 97.8  F (36.6  C) (Temporal)  Resp 14  There is no height or weight on file to calculate BMI.  GENERAL: alert and no acute distress  EYES: Eyes grossly normal to inspection, PERRL and conjunctivae and sclerae normal  NECK: no adenopathy, no asymmetry, masses, or scars and thyroid normal to palpation  RESP: lungs clear to auscultation - no rales, rhonchi or wheezes  CV: regular rate and rhythm, normal S1 S2, no S3 or S4, no murmur, click or rub, no peripheral edema and peripheral pulses strong  ABDOMEN: soft, nontender, no hepatosplenomegaly, no masses and bowel sounds normal  MS: no gross musculoskeletal defects noted, no edema  SKIN: no suspicious lesions or rashes  NEURO: Normal strength and tone, mentation intact and speech normal  PSYCH: mentation appears normal, affect normal/bright, judgement and insight intact and appearance well groomed    Diagnostic Test Results:  none     ASSESSMENT/PLAN:     1. Insomnia, unspecified insomnia  Improved somewhat with job change and overall improvement in mood because of this. Ordered Restoril as 7.5 mg capsules so that patient can work on tapering off of this over the next several months. Discussed nonpharmacologic methods of falling asleep and gave written information on this.   - temazepam (RESTORIL) 7.5 MG capsule; Take 1-2 capsules  (7.5-15 mg) by mouth nightly as needed for sleep  Dispense: 60 capsule; Refill: 3    2. Generalized anxiety disorder  Stable/improved. Decrease dose of Prozac to 20 mg daily per patient request. Continue buspirone twice daily. Follow up in one year or sooner if needed.   - busPIRone (BUSPAR) 15 MG tablet; Take 1 tablet (15 mg) by mouth 2 times daily  Dispense: 180 tablet; Refill: 3  - FLUoxetine (PROZAC) 20 MG capsule; Take 1 capsule (20 mg) by mouth daily  Dispense: 90 capsule; Refill: 3    3. Sleep disorder  Symptoms suspicious for narcolepsy. Recommended patient schedule appointment with her neurologist for further evaluation.     4. Need for prophylactic vaccination and inoculation against influenza  - FLU VACCINE, SPLIT VIRUS, IM (QUADRIVALENT) [07925]- >3 YRS  - Vaccine Administration, Initial [10708]    LARRY [facility] for HPI/ROS submitted by the patient on 10/9/2018   PHQ-2 Score: 0  If you checked off any problems, how difficult have these problems made it for you to do your work, take care of things at home, or get along with other people?: Not difficult at all  PHQ9 TOTAL SCORE: 5  BONNIE 7 TOTAL SCORE: 1

## 2018-10-09 ENCOUNTER — OFFICE VISIT (OUTPATIENT)
Dept: FAMILY MEDICINE | Facility: OTHER | Age: 43
End: 2018-10-09
Payer: COMMERCIAL

## 2018-10-09 VITALS
TEMPERATURE: 97.8 F | SYSTOLIC BLOOD PRESSURE: 108 MMHG | DIASTOLIC BLOOD PRESSURE: 72 MMHG | HEART RATE: 62 BPM | RESPIRATION RATE: 14 BRPM

## 2018-10-09 DIAGNOSIS — F41.1 GENERALIZED ANXIETY DISORDER: ICD-10-CM

## 2018-10-09 DIAGNOSIS — G47.00 INSOMNIA, UNSPECIFIED TYPE: Primary | ICD-10-CM

## 2018-10-09 DIAGNOSIS — Z23 NEED FOR PROPHYLACTIC VACCINATION AND INOCULATION AGAINST INFLUENZA: ICD-10-CM

## 2018-10-09 DIAGNOSIS — G47.9 SLEEP DISORDER: ICD-10-CM

## 2018-10-09 PROCEDURE — 90471 IMMUNIZATION ADMIN: CPT | Performed by: STUDENT IN AN ORGANIZED HEALTH CARE EDUCATION/TRAINING PROGRAM

## 2018-10-09 PROCEDURE — 99214 OFFICE O/P EST MOD 30 MIN: CPT | Mod: 25 | Performed by: STUDENT IN AN ORGANIZED HEALTH CARE EDUCATION/TRAINING PROGRAM

## 2018-10-09 PROCEDURE — 90686 IIV4 VACC NO PRSV 0.5 ML IM: CPT | Performed by: STUDENT IN AN ORGANIZED HEALTH CARE EDUCATION/TRAINING PROGRAM

## 2018-10-09 RX ORDER — BUSPIRONE HYDROCHLORIDE 15 MG/1
15 TABLET ORAL 2 TIMES DAILY
Qty: 180 TABLET | Refills: 3 | Status: SHIPPED | OUTPATIENT
Start: 2018-10-09 | End: 2019-07-09

## 2018-10-09 RX ORDER — TEMAZEPAM 7.5 MG/1
7.5-15 CAPSULE ORAL
Qty: 60 CAPSULE | Refills: 3 | Status: SHIPPED | OUTPATIENT
Start: 2018-10-09 | End: 2019-04-02

## 2018-10-09 ASSESSMENT — ANXIETY QUESTIONNAIRES
5. BEING SO RESTLESS THAT IT IS HARD TO SIT STILL: NOT AT ALL
7. FEELING AFRAID AS IF SOMETHING AWFUL MIGHT HAPPEN: NOT AT ALL
6. BECOMING EASILY ANNOYED OR IRRITABLE: NOT AT ALL
1. FEELING NERVOUS, ANXIOUS, OR ON EDGE: SEVERAL DAYS
4. TROUBLE RELAXING: NOT AT ALL
GAD7 TOTAL SCORE: 1
2. NOT BEING ABLE TO STOP OR CONTROL WORRYING: NOT AT ALL
7. FEELING AFRAID AS IF SOMETHING AWFUL MIGHT HAPPEN: NOT AT ALL
GAD7 TOTAL SCORE: 1
3. WORRYING TOO MUCH ABOUT DIFFERENT THINGS: NOT AT ALL
GAD7 TOTAL SCORE: 1

## 2018-10-09 ASSESSMENT — PATIENT HEALTH QUESTIONNAIRE - PHQ9
SUM OF ALL RESPONSES TO PHQ QUESTIONS 1-9: 5
10. IF YOU CHECKED OFF ANY PROBLEMS, HOW DIFFICULT HAVE THESE PROBLEMS MADE IT FOR YOU TO DO YOUR WORK, TAKE CARE OF THINGS AT HOME, OR GET ALONG WITH OTHER PEOPLE: NOT DIFFICULT AT ALL
SUM OF ALL RESPONSES TO PHQ QUESTIONS 1-9: 5

## 2018-10-09 ASSESSMENT — PAIN SCALES - GENERAL: PAINLEVEL: NO PAIN (0)

## 2018-10-09 NOTE — LETTER
My Depression Action Plan  Name: Deo Hargrove   Date of Birth 1975  Date: 10/8/2018    My doctor: Rosalinda Ho   My clinic: [facility]  [id number] [place] [address] [phone]          GREEN    ZONE   Good Control    What it looks like:     Things are going generally well. You have normal up s and down s. You may even feel depressed from time to time, but bad moods usually last less than a day.   What you need to do:  1. Continue to care for yourself (see self care plan)  2. Check your depression survival kit and update it as needed  3. Follow your physician s recommendations including any medication.  4. Do not stop taking medication unless you consult with your physician first.           YELLOW         ZONE Getting Worse    What it looks like:     Depression is starting to interfere with your life.     It may be hard to get out of bed; you may be starting to isolate yourself from others.    Symptoms of depression are starting to last most all day and this has happened for several days.     You may have suicidal thoughts but they are not constant.   What you need to do:     1. Call your care team, your response to treatment will improve if you keep your care team informed of your progress. Yellow periods are signs an adjustment may need to be made.     2. Continue your self-care, even if you have to fake it!    3. Talk to someone in your support network    4. Open up your depression survival kit           RED    ZONE Medical Alert - Get Help    What it looks like:     Depression is seriously interfering with your life.     You may experience these or other symptoms: You can t get out of bed most days, can t work or engage in other necessary activities, you have trouble taking care of basic hygiene, or basic responsibilities, thoughts of suicide or death that will not go away, self-injurious behavior.     What you need to do:  1. Call your care  team and request a same-day appointment. If they are not available (weekends or after hours) call your local crisis line, emergency room or 911.            Depression Self Care Plan / Survival Kit    Self-Care for Depression  Here s the deal. Your body and mind are really not as separate as most people think.  What you do and think affects how you feel and how you feel influences what you do and think. This means if you do things that people who feel good do, it will help you feel better.  Sometimes this is all it takes.  There is also a place for medication and therapy depending on how severe your depression is, so be sure to consult with your medical provider and/ or Behavioral Health Consultant if your symptoms are worsening or not improving.     In order to better manage my stress, I will:    Exercise  Get some form of exercise, every day. This will help reduce pain and release endorphins, the  feel good  chemicals in your brain. This is almost as good as taking antidepressants!  This is not the same as joining a gym and then never going! (they count on that by the way ) It can be as simple as just going for a walk or doing some gardening, anything that will get you moving.      Hygiene   Maintain good hygiene (Get out of bed in the morning, Make your bed, Brush your teeth, Take a shower, and Get dressed like you were going to work, even if you are unemployed).  If your clothes don't fit try to get ones that do.    Diet  I will strive to eat foods that are good for me, drink plenty of water, and avoid excessive sugar, caffeine, alcohol, and other mood-altering substances.  Some foods that are helpful in depression are: complex carbohydrates, B vitamins, flaxseed, fish or fish oil, fresh fruits and vegetables.    Psychotherapy  I agree to participate in Individual Therapy (if recommended).    Medication  If prescribed medications, I agree to take them.  Missing doses can result in serious side effects.  I  understand that drinking alcohol, or other illicit drug use, may cause potential side effects.  I will not stop my medication abruptly without first discussing it with my provider.    Staying Connected With Others  I will stay in touch with my friends, family members, and my primary care provider/team.    Use your imagination  Be creative.  We all have a creative side; it doesn t matter if it s oil painting, sand castles, or mud pies! This will also kick up the endorphins.    Witness Beauty  (AKA stop and smell the roses) Take a look outside, even in mid-winter. Notice colors, textures. Watch the squirrels and birds.     Service to others  Be of service to others.  There is always someone else in need.  By helping others we can  get out of ourselves  and remember the really important things.  This also provides opportunities for practicing all the other parts of the program.    Humor  Laugh and be silly!  Adjust your TV habits for less news and crime-drama and more comedy.    Control your stress  Try breathing deep, massage therapy, biofeedback, and meditation. Find time to relax each day.     My support system    Clinic Contact:  Phone number:    Contact 1:  Phone number:    Contact 2:  Phone number:    Sabianist/:  Phone number:    Therapist:  Phone number:    Local crisis center:    Phone number:    Other community support:  Phone number:

## 2018-10-09 NOTE — PROGRESS NOTES
Prior to injection verified patient identity using patient's name and date of birth.  Due to injection administration, patient instructed to remain in clinic for 15 minutes  afterwards, and to report any adverse reaction to me immediately.    Injectable Influenza Immunization Documentation    1.  Is the person to be vaccinated sick today?   No    2. Does the person to be vaccinated have an allergy to a component   of the vaccine?   No  Egg Allergy Algorithm Link    3. Has the person to be vaccinated ever had a serious reaction   to influenza vaccine in the past?   No    4. Has the person to be vaccinated ever had Guillain-Barré syndrome?   No    Form completed by SMA Lucio

## 2018-10-09 NOTE — MR AVS SNAPSHOT
After Visit Summary   10/9/2018    Deo Hargrove    MRN: [id number]           Patient Information     Date Of Birth          1975        Visit Information        Provider Department      10/9/2018 3:20 PM Rosalinda Ho APRN CNP [facility]        Today's Diagnoses     Screening for HIV (human immunodeficiency virus)        Need for prophylactic vaccination and inoculation against influenza        Generalized anxiety disorder        Insomnia, unspecified insomnia          Care Instructions      Here s how to do it:  1. Relax the muscles in your face, including tongue, jaw and the muscles around the eyes  2. Drop your shoulders as far down as they ll go, followed by your upper and lower arm, one side at a time  3. Breathe out, relaxing your chest followed by your legs, starting from the thighs and working down  4. You should then spend 10 seconds trying to clear your mind before thinking about one of the three following images:  You re lying in a canoe on a calm lake with nothing but a clear blue valeria above you   You re lying in a black velvet hammock in a pitch-black room   You say  don t think, don t think, don t think  to yourself over and over for about 10 seconds.            Follow-ups after your visit        Who to contact     If you have questions or need follow up information about today's clinic visit or your schedule please contact [facility] directly at [phone].  Normal or non-critical lab and imaging results will be communicated to you by MyChart, letter or phone within 4 business days after the clinic has received the results. If you do not hear from us within 7 days, please contact the clinic through MyChart or phone. If you have a critical or abnormal lab result, we will notify you by phone as soon as possible.  Submit refill requests through [employer] or call your pharmacy and they will forward the refill request to us. Please allow 3  business days for your refill to be completed.          Additional Information About Your Visit        [employer] Information     [employer] gives you secure access to your electronic health record. If you see a primary care provider, you can also send messages to your care team and make appointments. If you have questions, please call your primary care clinic.  If you do not have a primary care provider, please call [phone] and they will assist you.        Care EveryWhere ID     This is your Care EveryWhere ID. This could be used by other organizations to access your [facility] medical records  [id number]        Your Vitals Were     Pulse Temperature Respirations             62 97.8  F (36.6  C) (Temporal) 14          Blood Pressure from Last 3 Encounters:   10/09/18 108/72   04/27/18 106/64   11/03/17 108/74    Weight from Last 3 Encounters:   04/27/18 166 lb 8 oz (75.5 kg)   12/18/17 162 lb (73.5 kg)   11/13/17 162 lb (73.5 kg)              We Performed the Following     FLU VACCINE, SPLIT VIRUS, IM (QUADRIVALENT) [77611]- >3 YRS     Vaccine Administration, Initial [80726]          Today's Medication Changes          These changes are accurate as of 10/9/18  4:05 PM.  If you have any questions, ask your nurse or doctor.               These medicines have changed or have updated prescriptions.        Dose/Directions    FLUoxetine 20 MG capsule   Commonly known as:  PROzac   This may have changed:  how much to take   Used for:  Generalized anxiety disorder   Changed by:  Rosalinda Ho APRN CNP        Dose:  20 mg   Take 1 capsule (20 mg) by mouth daily   Quantity:  90 capsule   Refills:  3       * temazepam 15 MG capsule   Commonly known as:  RESTORIL   This may have changed:  Another medication with the same name was added. Make sure you understand how and when to take each.   Used for:  Insomnia, unspecified type   Changed by:  Rosalinda Ho APRN CNP        TAKE ONE CAPSULE BY MOUTH  NIGHTLY AS NEEDED   Quantity:  30 capsule   Refills:  0       * temazepam 7.5 MG capsule   Commonly known as:  RESTORIL   This may have changed:  You were already taking a medication with the same name, and this prescription was added. Make sure you understand how and when to take each.   Used for:  Insomnia, unspecified type   Changed by:  Rosalinda Ho APRN CNP        Dose:  7.5-15 mg   Take 1-2 capsules (7.5-15 mg) by mouth nightly as needed for sleep   Quantity:  60 capsule   Refills:  3       * Notice:  This list has 2 medication(s) that are the same as other medications prescribed for you. Read the directions carefully, and ask your doctor or other care provider to review them with you.         Where to get your medicines      These medications were sent to [facility] [address] [facility] [address] [address] [facility] [address]  [address] [address] [address]     Phone:  [phone]     busPIRone 15 MG tablet    FLUoxetine 20 MG capsule         Some of these will need a paper prescription and others can be bought over the counter.  Ask your nurse if you have questions.     Bring a paper prescription for each of these medications     temazepam 7.5 MG capsule                Primary Care Provider Office Phone # Fax #    LARRY Rdz [phone] [address] [facility] [address]        Equal Access to Services     EDMUNDO CABRERA AH: Hadii aad ku hadasho Soomaali, waaxda luqadaha, qaybta kaalmada adeegyada, olive terry haykarissa cooper . So [facility] [phone].    ATENCIÓN: Si habla español, tiene a castañeda disposición servicios gratuitos de asistencia lingüística. Llame al [phone].    We comply with applicable federal civil rights laws and [place] laws. We do not discriminate on the basis of race, color, national origin, age, disability, sex, sexual orientation, or gender identity.            Thank you!     Thank you  for choosing [facility]  for your care. Our goal is always to provide you with excellent care. Hearing back from our patients is one way we can continue to improve our services. Please take a few minutes to complete the written survey that you may receive in the mail after your visit with us. Thank you!             Your Updated Medication List - Protect others around you: Learn how to safely use, store and throw away your medicines at www.disposemymeds.org.          This list is accurate as of 10/9/18  4:05 PM.  Always use your most recent med list.                   Brand Name Dispense Instructions for use Diagnosis    busPIRone 15 MG tablet    BUSPAR    180 tablet    Take 1 tablet (15 mg) by mouth 2 times daily    Generalized anxiety disorder       * carBAMazepine 50 mg chewable half-tab    TEGretol     Take 100 mg by mouth as needed        * carBAMazepine 200 MG 12 hr capsule    CARBATROL     Take 200 mg by mouth 2 times daily        diclofenac 75 MG EC tablet    VOLTAREN    60 tablet    Take 1 tablet (75 mg) by mouth 2 times daily    Plantar fascial fibromatosis, Achilles tendinitis of right lower extremity       FLUoxetine 20 MG capsule    PROzac    90 capsule    Take 1 capsule (20 mg) by mouth daily    Generalized anxiety disorder       LORazepam 0.5 MG tablet    ATIVAN    20 tablet    TAKE 1 TABLET BY MOUTH EVERY DAILY AS NEEDED    Generalized anxiety disorder       * temazepam 15 MG capsule    RESTORIL    30 capsule    TAKE ONE CAPSULE BY MOUTH NIGHTLY AS NEEDED    Insomnia, unspecified type       * temazepam 7.5 MG capsule    RESTORIL    60 capsule    Take 1-2 capsules (7.5-15 mg) by mouth nightly as needed for sleep    Insomnia, unspecified type       topiramate 100 MG tablet    TOPAMAX    60 tablet    Take 1 tablet (100 mg) by mouth 2 times daily Prescribed by Neurologist        traMADol 50 MG tablet    ULTRAM     TK 1 T PO  AT THE ONSET OF MIGRAINE. CAN REPEAT ONCE AFTER 2 HOURS IF NEEDED         * Notice:  This list has 4 medication(s) that are the same as other medications prescribed for you. Read the directions carefully, and ask your doctor or other care provider to review them with you.

## 2018-10-09 NOTE — PATIENT INSTRUCTIONS
Here s how to do it:  1. Relax the muscles in your face, including tongue, jaw and the muscles around the eyes  2. Drop your shoulders as far down as they ll go, followed by your upper and lower arm, one side at a time  3. Breathe out, relaxing your chest followed by your legs, starting from the thighs and working down  4. You should then spend 10 seconds trying to clear your mind before thinking about one of the three following images:  You re lying in a canoe on a calm lake with nothing but a clear blue valeria above you   You re lying in a black velvet hammock in a pitch-black room   You say  don t think, don t think, don t think  to yourself over and over for about 10 seconds.

## 2018-10-10 ASSESSMENT — ANXIETY QUESTIONNAIRES: GAD7 TOTAL SCORE: 1

## 2018-10-10 ASSESSMENT — PATIENT HEALTH QUESTIONNAIRE - PHQ9: SUM OF ALL RESPONSES TO PHQ QUESTIONS 1-9: 5

## 2018-10-29 ENCOUNTER — TELEPHONE (OUTPATIENT)
Dept: FAMILY MEDICINE | Facility: OTHER | Age: 43
End: 2018-10-29

## 2018-10-29 DIAGNOSIS — F41.1 GENERALIZED ANXIETY DISORDER: ICD-10-CM

## 2018-10-30 RX ORDER — LORAZEPAM 0.5 MG/1
TABLET ORAL
Qty: 20 TABLET | Refills: 0 | Status: SHIPPED | OUTPATIENT
Start: 2018-10-30 | End: 2019-01-11

## 2018-10-30 NOTE — TELEPHONE ENCOUNTER
Ativan      Last Written Prescription Date:  9/5/2018  Last Fill Quantity: 20,   # refills: 0  Last Office Visit: 10/9/2018  Future Office visit:       Routing refill request to provider for review/approval because:  Drug not on the [facility], [facility] or  Health refill protocol or controlled substance    Sigrid WALTER Quiñones, BSN

## 2019-01-11 ENCOUNTER — TELEPHONE (OUTPATIENT)
Dept: FAMILY MEDICINE | Facility: OTHER | Age: 44
End: 2019-01-11

## 2019-01-11 DIAGNOSIS — F41.1 GENERALIZED ANXIETY DISORDER: ICD-10-CM

## 2019-01-14 RX ORDER — LORAZEPAM 0.5 MG/1
TABLET ORAL
Qty: 20 TABLET | Refills: 0 | Status: SHIPPED | OUTPATIENT
Start: 2019-01-14 | End: 2019-03-18

## 2019-01-14 NOTE — TELEPHONE ENCOUNTER
Will route back to EM basket for MA/TC to fax prescription. [place] unable to fax as it was locally printed in [place].

## 2019-01-21 ENCOUNTER — HOSPITAL ENCOUNTER (EMERGENCY)
Facility: CLINIC | Age: 44
Discharge: HOME | End: 2019-01-21
Attending: FAMILY MEDICINE | Admitting: FAMILY MEDICINE
Payer: COMMERCIAL

## 2019-01-21 VITALS
TEMPERATURE: 99 F | HEART RATE: 70 BPM | OXYGEN SATURATION: 96 % | SYSTOLIC BLOOD PRESSURE: 99 MMHG | WEIGHT: 165 LBS | RESPIRATION RATE: 16 BRPM | DIASTOLIC BLOOD PRESSURE: 65 MMHG | BODY MASS INDEX: 28.91 KG/M2

## 2019-01-21 DIAGNOSIS — K52.9 GASTROENTERITIS: ICD-10-CM

## 2019-01-21 DIAGNOSIS — R10.9 FLANK PAIN: ICD-10-CM

## 2019-01-21 LAB
ALBUMIN SERPL-MCNC: 4.1 G/DL (ref 3.4–5)
ALBUMIN UR-MCNC: NEGATIVE MG/DL
ALP SERPL-CCNC: 76 U/L (ref 40–150)
ALT SERPL W P-5'-P-CCNC: 22 U/L (ref 0–50)
ANION GAP SERPL CALCULATED.3IONS-SCNC: 8 MMOL/L (ref 3–14)
APPEARANCE UR: ABNORMAL
AST SERPL W P-5'-P-CCNC: 14 U/L (ref 0–45)
BASOPHILS # BLD AUTO: 0 10E9/L (ref 0–0.2)
BASOPHILS NFR BLD AUTO: 0 %
BILIRUB DIRECT SERPL-MCNC: 0.2 MG/DL (ref 0–0.2)
BILIRUB SERPL-MCNC: 0.6 MG/DL (ref 0.2–1.3)
BILIRUB UR QL STRIP: NEGATIVE
BUN SERPL-MCNC: 16 MG/DL (ref 7–30)
CALCIUM SERPL-MCNC: 8.1 MG/DL (ref 8.5–10.1)
CHLORIDE SERPL-SCNC: 112 MMOL/L (ref 94–109)
CO2 SERPL-SCNC: 22 MMOL/L (ref 20–32)
COLOR UR AUTO: YELLOW
CREAT SERPL-MCNC: 0.71 MG/DL (ref 0.52–1.04)
DIFFERENTIAL METHOD BLD: ABNORMAL
EOSINOPHIL NFR BLD AUTO: 0.1 %
ERYTHROCYTE [DISTWIDTH] IN BLOOD BY AUTOMATED COUNT: 11.8 % (ref 10–15)
GFR SERPL CREATININE-BSD FRML MDRD: >90 ML/MIN/{1.73_M2}
GLUCOSE SERPL-MCNC: 136 MG/DL (ref 70–99)
GLUCOSE UR STRIP-MCNC: NEGATIVE MG/DL
HCG UR QL: NEGATIVE
HCT VFR BLD AUTO: 38.1 % (ref 35–47)
HGB BLD-MCNC: 12.9 G/DL (ref 11.7–15.7)
HGB UR QL STRIP: NEGATIVE
IMM GRANULOCYTES # BLD: 0 10E9/L (ref 0–0.4)
IMM GRANULOCYTES NFR BLD: 0.3 %
KETONES UR STRIP-MCNC: NEGATIVE MG/DL
LEUKOCYTE ESTERASE UR QL STRIP: NEGATIVE
LYMPHOCYTES # BLD AUTO: 0.2 10E9/L (ref 0.8–5.3)
LYMPHOCYTES NFR BLD AUTO: 2.5 %
MCH RBC QN AUTO: 31.4 PG (ref 26.5–33)
MCHC RBC AUTO-ENTMCNC: 33.9 G/DL (ref 31.5–36.5)
MCV RBC AUTO: 93 FL (ref 78–100)
MONOCYTES # BLD AUTO: 0.2 10E9/L (ref 0–1.3)
MONOCYTES NFR BLD AUTO: 2.5 %
MUCOUS THREADS #/AREA URNS LPF: PRESENT /LPF
NEUTROPHILS # BLD AUTO: 7.1 10E9/L (ref 1.6–8.3)
NEUTROPHILS NFR BLD AUTO: 94.6 %
NITRATE UR QL: NEGATIVE
NRBC # BLD AUTO: 0 10*3/UL
NRBC BLD AUTO-RTO: 0 /100
PH UR STRIP: 6 PH (ref 5–7)
PLATELET # BLD AUTO: 230 10E9/L (ref 150–450)
POTASSIUM SERPL-SCNC: 3.7 MMOL/L (ref 3.4–5.3)
PROT SERPL-MCNC: 8 G/DL (ref 6.8–8.8)
RBC # BLD AUTO: 4.11 10E12/L (ref 3.8–5.2)
RBC #/AREA URNS AUTO: 2 /HPF (ref 0–2)
SODIUM SERPL-SCNC: 142 MMOL/L (ref 133–144)
SOURCE: ABNORMAL
SP GR UR STRIP: 1.03 (ref 1–1.03)
SQUAMOUS #/AREA URNS AUTO: 3 /HPF (ref 0–1)
UROBILINOGEN UR STRIP-MCNC: 0 MG/DL (ref 0–2)
WBC # BLD AUTO: 7.5 10E9/L (ref 4–11)
WBC #/AREA URNS AUTO: 3 /HPF (ref 0–5)

## 2019-01-21 PROCEDURE — 25000128 H RX IP 250 OP 636: Performed by: FAMILY MEDICINE

## 2019-01-21 PROCEDURE — 80076 HEPATIC FUNCTION PANEL: CPT | Performed by: FAMILY MEDICINE

## 2019-01-21 PROCEDURE — 81025 URINE PREGNANCY TEST: CPT | Performed by: FAMILY MEDICINE

## 2019-01-21 PROCEDURE — 81001 URINALYSIS AUTO W/SCOPE: CPT | Performed by: FAMILY MEDICINE

## 2019-01-21 PROCEDURE — 96374 THER/PROPH/DIAG INJ IV PUSH: CPT | Performed by: FAMILY MEDICINE

## 2019-01-21 PROCEDURE — 96376 TX/PRO/DX INJ SAME DRUG ADON: CPT | Performed by: FAMILY MEDICINE

## 2019-01-21 PROCEDURE — 99285 EMERGENCY DEPT VISIT HI MDM: CPT | Mod: Z6 | Performed by: FAMILY MEDICINE

## 2019-01-21 PROCEDURE — 85025 COMPLETE CBC W/AUTO DIFF WBC: CPT | Performed by: FAMILY MEDICINE

## 2019-01-21 PROCEDURE — 80048 BASIC METABOLIC PNL TOTAL CA: CPT | Performed by: FAMILY MEDICINE

## 2019-01-21 PROCEDURE — 99285 EMERGENCY DEPT VISIT HI MDM: CPT | Mod: 25 | Performed by: FAMILY MEDICINE

## 2019-01-21 PROCEDURE — 96361 HYDRATE IV INFUSION ADD-ON: CPT | Performed by: FAMILY MEDICINE

## 2019-01-21 PROCEDURE — 96375 TX/PRO/DX INJ NEW DRUG ADDON: CPT | Performed by: FAMILY MEDICINE

## 2019-01-21 RX ORDER — ONDANSETRON 4 MG/1
4 TABLET, ORALLY DISINTEGRATING ORAL EVERY 6 HOURS PRN
Qty: 10 TABLET | Refills: 0 | Status: SHIPPED | OUTPATIENT
Start: 2019-01-21 | End: 2019-01-24

## 2019-01-21 RX ORDER — HYDROMORPHONE HYDROCHLORIDE 1 MG/ML
0.5 INJECTION, SOLUTION INTRAMUSCULAR; INTRAVENOUS; SUBCUTANEOUS
Status: COMPLETED | OUTPATIENT
Start: 2019-01-21 | End: 2019-01-21

## 2019-01-21 RX ORDER — ONDANSETRON 2 MG/ML
4 INJECTION INTRAMUSCULAR; INTRAVENOUS EVERY 30 MIN PRN
Status: DISCONTINUED | OUTPATIENT
Start: 2019-01-21 | End: 2019-01-21 | Stop reason: HOSPADM

## 2019-01-21 RX ORDER — SODIUM CHLORIDE 9 MG/ML
1000 INJECTION, SOLUTION INTRAVENOUS CONTINUOUS
Status: DISCONTINUED | OUTPATIENT
Start: 2019-01-21 | End: 2019-01-21 | Stop reason: HOSPADM

## 2019-01-21 RX ORDER — SODIUM CHLORIDE 9 MG/ML
1000 INJECTION, SOLUTION INTRAVENOUS CONTINUOUS
Status: DISCONTINUED | OUTPATIENT
Start: 2019-01-21 | End: 2019-01-21

## 2019-01-21 RX ORDER — ONDANSETRON 2 MG/ML
4 INJECTION INTRAMUSCULAR; INTRAVENOUS ONCE
Status: COMPLETED | OUTPATIENT
Start: 2019-01-21 | End: 2019-01-21

## 2019-01-21 RX ORDER — SODIUM CHLORIDE 9 MG/ML
INJECTION, SOLUTION INTRAVENOUS ONCE
Status: COMPLETED | OUTPATIENT
Start: 2019-01-21 | End: 2019-01-21

## 2019-01-21 RX ADMIN — ONDANSETRON 4 MG: 2 INJECTION INTRAMUSCULAR; INTRAVENOUS at 11:25

## 2019-01-21 RX ADMIN — ONDANSETRON 4 MG: 2 INJECTION INTRAMUSCULAR; INTRAVENOUS at 12:26

## 2019-01-21 RX ADMIN — SODIUM CHLORIDE 1000 ML: 9 INJECTION, SOLUTION INTRAVENOUS at 10:27

## 2019-01-21 RX ADMIN — HYDROMORPHONE HYDROCHLORIDE 0.5 MG: 1 INJECTION, SOLUTION INTRAMUSCULAR; INTRAVENOUS; SUBCUTANEOUS at 10:53

## 2019-01-21 RX ADMIN — HYDROMORPHONE HYDROCHLORIDE 0.5 MG: 1 INJECTION, SOLUTION INTRAMUSCULAR; INTRAVENOUS; SUBCUTANEOUS at 12:29

## 2019-01-21 RX ADMIN — HYDROMORPHONE HYDROCHLORIDE 0.5 MG: 1 INJECTION, SOLUTION INTRAMUSCULAR; INTRAVENOUS; SUBCUTANEOUS at 11:50

## 2019-01-21 RX ADMIN — ONDANSETRON 4 MG: 2 INJECTION INTRAMUSCULAR; INTRAVENOUS at 10:27

## 2019-01-21 RX ADMIN — SODIUM CHLORIDE 1000 ML: 9 INJECTION, SOLUTION INTRAVENOUS at 12:26

## 2019-01-21 ASSESSMENT — ENCOUNTER SYMPTOMS
FEVER: 0
NAUSEA: 1
CHEST TIGHTNESS: 0
CHILLS: 0
SORE THROAT: 0
SHORTNESS OF BREATH: 0
VOMITING: 1
BACK PAIN: 1
ABDOMINAL PAIN: 1
DIARRHEA: 1
CONFUSION: 0
FREQUENCY: 0
DIZZINESS: 0
DYSURIA: 0
EYE REDNESS: 0
WEAKNESS: 0
HEMATURIA: 0

## 2019-01-21 NOTE — ED PROVIDER NOTES
History     Chief Complaint   Patient presents with     Abdominal Pain     HPI  Deo Hargrove is a 43 year old female who presents to the emergency department with a 15-hour history of nausea vomiting diarrhea and sharp stabbing right flank pain.  The patient's son was recently ill with vomiting and diarrhea and is already better.  She started getting the same symptoms last night and assumed that she had contracted it  however around 10 PM she then got severe sharp stabbing well localized right flank pain.  She has a history of kidney stones the last 1 in .  She also has a history of kidney infections.  The nausea vomiting and diarrhea were gradual onset in the right flank pain was abrupt onset.  She denies feeling feverish or chilled.  She tried a Zofran at home without any benefit.  She has not taken anything for the pain.  Her  is currently well.    Allergies:  Allergies   Allergen Reactions     Penicillins      Triptans [Sumatriptan]      Tightness of the throat       Problem List:    Patient Active Problem List    Diagnosis Date Noted     Insomnia 2014     Priority: Medium     Generalized anxiety disorder 12/10/2012     Priority: Medium     Diagnosis updated by automated process. Provider to review and confirm.       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     Priority: Medium     Migraine headache 2012     Priority: Medium        Past Medical History:    Past Medical History:   Diagnosis Date     Anxiety      Headaches, migraine      Kidney stone        Past Surgical History:    Past Surgical History:   Procedure Laterality Date      SECTION       GYN SURGERY      had LT tube removed       Family History:    Family History   Problem Relation Age of Onset     Hypertension Mother      Lipids Mother      Alcohol/Drug Father      Breast Cancer Maternal Grandmother      Breast Cancer Paternal Grandmother        Social History:  Marital Status:   [2]  Social History      Tobacco Use     Smoking status: Never Smoker     Smokeless tobacco: Never Used   Substance Use Topics     Alcohol use: Yes     Comment: occasional     Drug use: No        Medications:      busPIRone (BUSPAR) 15 MG tablet   carBAMazepine (CARBATROL) 200 MG 12 hr capsule   carBAMazepine (TEGRETOL)   FLUoxetine (PROZAC) 20 MG capsule   LORazepam (ATIVAN) 0.5 MG tablet   ondansetron (ZOFRAN ODT) 4 MG ODT tab   temazepam (RESTORIL) 7.5 MG capsule   topiramate (TOPAMAX) 100 MG tablet   traMADol (ULTRAM) 50 MG tablet   temazepam (RESTORIL) 15 MG capsule         Review of Systems   Constitutional: Negative for chills and fever.   HENT: Negative for congestion and sore throat.    Eyes: Negative for redness.   Respiratory: Negative for chest tightness and shortness of breath.    Cardiovascular: Negative for chest pain.   Gastrointestinal: Positive for abdominal pain, diarrhea, nausea and vomiting.   Genitourinary: Negative for dysuria, frequency, hematuria and pelvic pain.   Musculoskeletal: Positive for back pain.   Neurological: Negative for dizziness and weakness.   Psychiatric/Behavioral: Negative for confusion.   All other systems reviewed and are negative.      Physical Exam   BP: 112/74  Pulse: 90  Temp: 99  F (37.2  C)  Resp: 16  Weight: 74.8 kg (165 lb)  SpO2: 99 %      Physical Exam   Constitutional: She is oriented to person, place, and time. She appears well-developed and well-nourished. She appears distressed.   HENT:   Head: Normocephalic and atraumatic.   Right Ear: External ear normal.   Left Ear: External ear normal.   Nose: Nose normal.   Mouth/Throat: Oropharynx is clear and moist.   Eyes: Conjunctivae and EOM are normal. Pupils are equal, round, and reactive to light.   Neck: Normal range of motion.   Cardiovascular: Normal rate, regular rhythm, normal heart sounds and intact distal pulses.   Pulmonary/Chest: Effort normal and breath sounds normal.   Abdominal: Soft. Bowel sounds are normal.    Musculoskeletal: Normal range of motion.   Neurological: She is alert and oriented to person, place, and time.   Skin: Skin is warm and dry. Capillary refill takes less than 2 seconds.   Psychiatric: She has a normal mood and affect.   Vitals reviewed.      ED Course        Procedures               Critical Care time:  none   11:35 AM--she is feeling better with pain down to a 4/10.  Still has some nausea.  Patient given another dose of Zofran and Dilaudid and then we will see if she can take oral liquids.  I discussed options with the patient and I do not see a strong indication due to CAT scan at this time.  Her urine only has a couple red cells and is otherwise a dirty specimen.  She definitely does not have a kidney infection with no fever normal white count and clear urine.            Results for orders placed or performed during the hospital encounter of 01/21/19 (from the past 24 hour(s))   CBC with platelets differential   Result Value Ref Range    WBC 7.5 4.0 - 11.0 10e9/L    RBC Count 4.11 3.8 - 5.2 10e12/L    Hemoglobin 12.9 11.7 - 15.7 g/dL    Hematocrit 38.1 35.0 - 47.0 %    MCV 93 78 - 100 fl    MCH 31.4 26.5 - 33.0 pg    MCHC 33.9 31.5 - 36.5 g/dL    RDW 11.8 10.0 - 15.0 %    Platelet Count 230 150 - 450 10e9/L    Diff Method Automated Method     % Neutrophils 94.6 %    % Lymphocytes 2.5 %    % Monocytes 2.5 %    % Eosinophils 0.1 %    % Basophils 0.0 %    % Immature Granulocytes 0.3 %    Nucleated RBCs 0 0 /100    Absolute Neutrophil 7.1 1.6 - 8.3 10e9/L    Absolute Lymphocytes 0.2 (L) 0.8 - 5.3 10e9/L    Absolute Monocytes 0.2 0.0 - 1.3 10e9/L    Absolute Basophils 0.0 0.0 - 0.2 10e9/L    Abs Immature Granulocytes 0.0 0 - 0.4 10e9/L    Absolute Nucleated RBC 0.0    Basic metabolic panel   Result Value Ref Range    Sodium 142 133 - 144 mmol/L    Potassium 3.7 3.4 - 5.3 mmol/L    Chloride 112 (H) 94 - 109 mmol/L    Carbon Dioxide 22 20 - 32 mmol/L    Anion Gap 8 3 - 14 mmol/L    Glucose 136 (H) 70 -  99 mg/dL    Urea Nitrogen 16 7 - 30 mg/dL    Creatinine 0.71 0.52 - 1.04 mg/dL    GFR Estimate >90 >60 mL/min/[1.73_m2]    GFR Estimate If Black >90 >60 mL/min/[1.73_m2]    Calcium 8.1 (L) 8.5 - 10.1 mg/dL   Hepatic panel   Result Value Ref Range    Bilirubin Direct 0.2 0.0 - 0.2 mg/dL    Bilirubin Total 0.6 0.2 - 1.3 mg/dL    Albumin 4.1 3.4 - 5.0 g/dL    Protein Total 8.0 6.8 - 8.8 g/dL    Alkaline Phosphatase 76 40 - 150 U/L    ALT 22 0 - 50 U/L    AST 14 0 - 45 U/L   Routine UA with microscopic   Result Value Ref Range    Color Urine Yellow     Appearance Urine Slightly Cloudy     Glucose Urine Negative NEG^Negative mg/dL    Bilirubin Urine Negative NEG^Negative    Ketones Urine Negative NEG^Negative mg/dL    Specific Gravity Urine 1.026 1.003 - 1.035    Blood Urine Negative NEG^Negative    pH Urine 6.0 5.0 - 7.0 pH    Protein Albumin Urine Negative NEG^Negative mg/dL    Urobilinogen mg/dL 0.0 0.0 - 2.0 mg/dL    Nitrite Urine Negative NEG^Negative    Leukocyte Esterase Urine Negative NEG^Negative    Source Midstream Urine     WBC Urine 3 0 - 5 /HPF    RBC Urine 2 0 - 2 /HPF    Squamous Epithelial /HPF Urine 3 (H) 0 - 1 /HPF    Mucous Urine Present (A) NEG^Negative /LPF   HCG qualitative urine   Result Value Ref Range    HCG Qual Urine Negative NEG^Negative       Medications   ondansetron (ZOFRAN) injection 4 mg (4 mg Intravenous Given 1/21/19 1226)   0.9% sodium chloride BOLUS (0 mLs Intravenous Stopped 1/21/19 1328)     Followed by   sodium chloride 0.9% infusion (not administered)   sodium chloride 0.9% infusion ( Intravenous Stopped 1/21/19 1127)   ondansetron (ZOFRAN) injection 4 mg (4 mg Intravenous Given 1/21/19 1027)   HYDROmorphone (PF) (DILAUDID) injection 0.5 mg (0.5 mg Intravenous Given 1/21/19 1229)         1:34 PM--patient feeling better and requests discharge home      Assessments & Plan (with Medical Decision Making)   [facility]--43-year-old female who presents to the emergency department with a 15-hour  history of nausea vomiting and diarrhea.  She assumed that she had contracted her son's stomach flu.  He had similar symptoms for 24 hours and is now better.   is well.  No travel history.  However she developed severe sharp stabbing right flank pain last night which was strong enough to keep her awake all night.  She has a history of kidney stones.  Her urine has only 2 red cells and 3 squamous epithelial cells.  She feels like the flank pain is different than when she has had kidney stone pain.  Her abdominal exam is unremarkable and there is no right upper quadrant tenderness and nothing to suggest gallbladder disease.  She has a very benign abdominal exam.  I suspect she has gastroenteritis which she caught from her son and that she has both vomiting and diarrhea.  The flank pain is suspect may be related to all the vomiting or the gastroenteritis itself.  I discussed a CT scan with her but did not feel it was indicated and she agrees.  She will be sent home with oral Zofran to the pharmacy.  She was given 2 L of fluid and feels much better.  Patient is comfortable with this evaluation treatment and discharge plan she is discharged in improved condition      I have reviewed the nursing notes.    I have reviewed the findings, diagnosis, plan and need for follow up with the patient.          Medication List      Started    ondansetron 4 MG ODT tab  Commonly known as:  ZOFRAN ODT  4 mg, Oral, EVERY 6 HOURS PRN            Final diagnoses:   Gastroenteritis   Flank pain       1/21/2019   [facility] EMERGENCY DEPARTMENT     Riddhi, Coleman MAX MD  01/21/19 4945

## 2019-01-21 NOTE — ED TRIAGE NOTES
Patient presents with concerns for nausea and vomiting and R) flank pain since last evening about 2100. She reports he son has been sick as well. Jeannette Lee RN

## 2019-01-21 NOTE — ED NOTES
Plan to give another dose of dilaudid and try po fluids. If unable to leroy fluids will repeat zofran and start second bag of IV fluids per Dr Hannon

## 2019-01-21 NOTE — ED AVS SNAPSHOT
[facility] Emergency Department  [address] [place] [address]  Phone:  [phone]  Fax:  [phone]                                    Deo Hargrove   MRN: [id number]    Department:  [facility] Emergency Department   Date of Visit:  1/21/2019           After Visit Summary Signature Page    I have received my discharge instructions, and my questions have been answered. I have discussed any challenges I see with this plan with the nurse or doctor.    ..........................................................................................................................................  Patient/Patient Representative Signature      ..........................................................................................................................................  Patient Representative Print Name and Relationship to Patient    ..................................................               ................................................  Date                                   Time    ..........................................................................................................................................  Reviewed by Signature/Title    ...................................................              ..............................................  Date                                               Time          22EPIC Rev 08/18

## 2019-01-28 ENCOUNTER — TRANSFERRED RECORDS (OUTPATIENT)
Dept: HEALTH INFORMATION MANAGEMENT | Facility: CLINIC | Age: 44
End: 2019-01-28

## 2019-03-12 DIAGNOSIS — F41.1 GENERALIZED ANXIETY DISORDER: ICD-10-CM

## 2019-03-12 NOTE — TELEPHONE ENCOUNTER
LORazepam (ATIVAN) 0.5 MG tablet      Last Written Prescription Date:  1/14/19  Last Fill Quantity: 30,   # refills: 0  Last Office Visit: 10/9/18 Vic  Future Office visit:       Routing refill request to provider for review/approval because:  Drug not on the [facility], [facility] or [facility] refill protocol or controlled substance

## 2019-03-18 RX ORDER — LORAZEPAM 0.5 MG/1
TABLET ORAL
Qty: 20 TABLET | Refills: 0 | Status: SHIPPED | OUTPATIENT
Start: 2019-03-18 | End: 2019-07-03

## 2019-04-01 DIAGNOSIS — G47.00 INSOMNIA, UNSPECIFIED TYPE: ICD-10-CM

## 2019-04-02 RX ORDER — TEMAZEPAM 7.5 MG/1
7.5-15 CAPSULE ORAL
Qty: 60 CAPSULE | Refills: 3 | Status: SHIPPED | OUTPATIENT
Start: 2019-04-02 | End: 2019-07-09

## 2019-04-02 NOTE — TELEPHONE ENCOUNTER
Refill approved. It looks like Paige has encouraged patient to continue to wean herself off of this. Will need OV before next fill.    Natalie Boucher PA-C  Covering for Rosalinda Ho

## 2019-07-01 ENCOUNTER — TELEPHONE (OUTPATIENT)
Dept: FAMILY MEDICINE | Facility: OTHER | Age: 44
End: 2019-07-01

## 2019-07-01 DIAGNOSIS — F41.1 GENERALIZED ANXIETY DISORDER: ICD-10-CM

## 2019-07-01 NOTE — TELEPHONE ENCOUNTER
Pending Prescriptions:                       Disp   Refills    LORazepam (ATIVAN) 0.5 MG tablet          20 tab*0            Sig: TAKE 1 TABLET BY MOUTH DAILY AS NEEDED        Last Written Prescription Date:  3/18/19  Last Fill Quantity: 20,   # refills: 0  Last Office Visit: 10/9/18  Future Office visit:       Routing refill request to provider for review/approval because:  Drug not on the [facility], [facility] or [facility] refill protocol or controlled substance    Payton Wang, RN, BSN

## 2019-07-03 RX ORDER — LORAZEPAM 0.5 MG/1
TABLET ORAL
Qty: 20 TABLET | Refills: 0 | Status: SHIPPED | OUTPATIENT
Start: 2019-07-03 | End: 2019-07-09

## 2019-07-03 NOTE — PROGRESS NOTES
Subjective     Deo Hargrove is a 43 year old female who presents to clinic today for the following health issues:    History of Present Illness        She eats 0-1 servings of fruits and vegetables daily.She consumes 1 sweetened beverage(s) daily.  She is taking medications regularly.     Medication Followup of temazepam    Taking Medication as prescribed: yes, she has been taking 7.5 mg at bedtime.  She tried going without the medication but she was unable to fall asleep.  She says she would prefer to go off of the medication but she is tried several times to not take it at night and is unable to sleep.  When she takes the 7.5 mg dose she is able to sleep on average 7 hours.    Side Effects:  None    Medication Helping Symptoms:  yes     BONNIE-7 SCORE 4/27/2018 10/9/2018 7/9/2019   Total Score - - -   Total Score 14 (moderate anxiety) 1 (minimal anxiety) 8 (mild anxiety)   Total Score 14 1 8     Anxiety  She is also following up on her anxiety.  She has generalized anxiety is taking fluoxetine and buspirone.  She tried going down to 1 tablet of buspirone daily but found that she would feel panicked and worried about things she usually does not worry about in the afternoon.  Once she started taking the second dose of buspirone the symptoms improved.  She is wondering if she would be able to go off of the fluoxetine as she wonders if it is helping at all.  Approximately once weekly she will have panic attacks symptoms and utilizes Lorazepam for this.    She follows with neurology for facial neuralgia.  She takes carbamazepine and topiramate daily.    Answers for HPI/ROS submitted by the patient on 7/9/2019   Chronic problems general questions HPI Form  If you checked off any problems, how difficult have these problems made it for you to do your work, take care of things at home, or get along with other people?: Somewhat difficult  PHQ9 TOTAL SCORE: 6  BONNIE 7 TOTAL SCORE: 8    Patient Active Problem List   Diagnosis      Migraine headache     Mild recurrent major depression (H)     Generalized anxiety disorder     Insomnia     Past Surgical History:   Procedure Laterality Date      SECTION       GYN SURGERY      had LT tube removed       Social History     Tobacco Use     Smoking status: Never Smoker     Smokeless tobacco: Never Used   Substance Use Topics     Alcohol use: Yes     Comment: occasional     Family History   Problem Relation Age of Onset     Hypertension Mother      Lipids Mother      Alcohol/Drug Father      Breast Cancer Maternal Grandmother      Breast Cancer Paternal Grandmother          Current Outpatient Medications   Medication Sig Dispense Refill     busPIRone (BUSPAR) 15 MG tablet Take 1 tablet (15 mg) by mouth 2 times daily 180 tablet 3     carBAMazepine (CARBATROL) 200 MG 12 hr capsule Take 1 capsule (200 mg) by mouth daily And twice daily if needed       FLUoxetine (PROZAC) 20 MG capsule Take 1 capsule (20 mg) by mouth daily 90 capsule 3     LORazepam (ATIVAN) 0.5 MG tablet TAKE 1 TABLET BY MOUTH DAILY AS NEEDED 20 tablet 0     temazepam (RESTORIL) 7.5 MG capsule Take 1 capsule (7.5 mg) by mouth nightly as needed for sleep 30 capsule 5     topiramate (TOPAMAX) 100 MG tablet Take 1 tablet (100 mg) by mouth 2 times daily Prescribed by Neurologist 60 tablet 1     traMADol (ULTRAM) 50 MG tablet TK 1 T PO  AT THE ONSET OF MIGRAINE. CAN REPEAT ONCE AFTER 2 HOURS IF NEEDED  1     Allergies   Allergen Reactions     Penicillins      Triptans [Sumatriptan]      Tightness of the throat     BP Readings from Last 3 Encounters:   19 92/62   19 99/65   10/09/18 108/72    Wt Readings from Last 3 Encounters:   19 75.5 kg (166 lb 6.4 oz)   19 74.8 kg (165 lb)   18 75.5 kg (166 lb 8 oz)                    Reviewed and updated as needed this visit by Provider         Review of Systems   ROS COMP: Constitutional, HEENT, cardiovascular, pulmonary, GI, , musculoskeletal, neuro, skin,  "endocrine and psych systems are negative, except as otherwise noted.      Objective    BP 92/62   Pulse 68   Temp 97.1  F (36.2  C) (Temporal)   Resp 12   Ht 1.609 m (5' 3.35\")   Wt 75.5 kg (166 lb 6.4 oz)   BMI 29.16 kg/m    Body mass index is 29.16 kg/m .  Physical Exam   GENERAL: healthy, alert and no distress  SKIN: no suspicious lesions or rashes  NEURO: Normal strength and tone, mentation intact and speech normal  PSYCH: mentation appears normal, affect normal/bright, mildly anxious, judgement and insight intact and appearance well groomed    Diagnostic Test Results:  Labs reviewed in Epic  No results found for this or any previous visit (from the past 24 hour(s)).        Assessment & Plan     1. Generalized anxiety disorder  Stable. Continue current medication(s) and dose(s) of buspirone. She may try going off of fluoxetine to see if her anxiety or depression symptoms change when she is off of that.  Continue buspirone twice daily.  She is taking lorazepam approximately once weekly for panic attack symptoms.  Okay for refills x2 in the future.  - LORazepam (ATIVAN) 0.5 MG tablet; TAKE 1 TABLET BY MOUTH DAILY AS NEEDED  Dispense: 20 tablet; Refill: 0  - busPIRone (BUSPAR) 15 MG tablet; Take 1 tablet (15 mg) by mouth 2 times daily  Dispense: 180 tablet; Refill: 3    2. Insomnia, unspecified insomnia  Stable.  Continue current medication.  Patient would like to eventually come off of this but has not been able to do so yet.  - temazepam (RESTORIL) 7.5 MG capsule; Take 1 capsule (7.5 mg) by mouth nightly as needed for sleep  Dispense: 30 capsule; Refill: 5    3. Mild recurrent major depression (H)  PHQ-9 SCORE 4/27/2018 10/9/2018 7/9/2019   PHQ-9 Total Score - - -   PHQ-9 Total Score MyChart 10 (Moderate depression) 5 (Mild depression) 6 (Mild depression)   PHQ-9 Total Score 10 5 6     Stable. Continue current medication(s) and dose(s).     4. Neuralgia  Follows with neurology  - carBAMazepine (CARBATROL) " "200 MG 12 hr capsule; Take 1 capsule (200 mg) by mouth daily And twice daily if needed    5. Screening for hyperlipidemia  - Lipid panel reflex to direct LDL Fasting    6. Visit for Mantoux test  - TB INTRADERMAL TEST     BMI:   Estimated body mass index is 29.16 kg/m  as calculated from the following:    Height as of this encounter: 1.609 m (5' 3.35\").    Weight as of this encounter: 75.5 kg (166 lb 6.4 oz).   Weight management plan: Discussed healthy diet and exercise guidelines    No follow-ups on file.    LARRY Brooks [facility]    "

## 2019-07-09 ENCOUNTER — OFFICE VISIT (OUTPATIENT)
Dept: FAMILY MEDICINE | Facility: OTHER | Age: 44
End: 2019-07-09
Payer: COMMERCIAL

## 2019-07-09 VITALS
TEMPERATURE: 97.1 F | HEART RATE: 68 BPM | BODY MASS INDEX: 29.48 KG/M2 | RESPIRATION RATE: 12 BRPM | DIASTOLIC BLOOD PRESSURE: 62 MMHG | SYSTOLIC BLOOD PRESSURE: 92 MMHG | WEIGHT: 166.4 LBS | HEIGHT: 63 IN

## 2019-07-09 DIAGNOSIS — F41.1 GENERALIZED ANXIETY DISORDER: Primary | ICD-10-CM

## 2019-07-09 DIAGNOSIS — M79.2 NEURALGIA: ICD-10-CM

## 2019-07-09 DIAGNOSIS — Z11.1 VISIT FOR MANTOUX TEST: ICD-10-CM

## 2019-07-09 DIAGNOSIS — Z13.220 SCREENING FOR HYPERLIPIDEMIA: ICD-10-CM

## 2019-07-09 DIAGNOSIS — G47.00 INSOMNIA, UNSPECIFIED TYPE: ICD-10-CM

## 2019-07-09 DIAGNOSIS — F33.0 MILD RECURRENT MAJOR DEPRESSION (H): ICD-10-CM

## 2019-07-09 LAB
CHOLEST SERPL-MCNC: 228 MG/DL
HDLC SERPL-MCNC: 62 MG/DL
LDLC SERPL CALC-MCNC: 139 MG/DL
NONHDLC SERPL-MCNC: 166 MG/DL
TRIGL SERPL-MCNC: 133 MG/DL

## 2019-07-09 PROCEDURE — 36415 COLL VENOUS BLD VENIPUNCTURE: CPT | Performed by: STUDENT IN AN ORGANIZED HEALTH CARE EDUCATION/TRAINING PROGRAM

## 2019-07-09 PROCEDURE — 86580 TB INTRADERMAL TEST: CPT | Performed by: STUDENT IN AN ORGANIZED HEALTH CARE EDUCATION/TRAINING PROGRAM

## 2019-07-09 PROCEDURE — 99214 OFFICE O/P EST MOD 30 MIN: CPT | Performed by: STUDENT IN AN ORGANIZED HEALTH CARE EDUCATION/TRAINING PROGRAM

## 2019-07-09 PROCEDURE — 80061 LIPID PANEL: CPT | Performed by: STUDENT IN AN ORGANIZED HEALTH CARE EDUCATION/TRAINING PROGRAM

## 2019-07-09 RX ORDER — TEMAZEPAM 7.5 MG/1
7.5 CAPSULE ORAL
Qty: 30 CAPSULE | Refills: 5 | Status: SHIPPED | OUTPATIENT
Start: 2019-07-09 | End: 2020-01-21

## 2019-07-09 RX ORDER — CARBAMAZEPINE 200 MG/1
200 CAPSULE, EXTENDED RELEASE ORAL DAILY
Start: 2019-07-09

## 2019-07-09 RX ORDER — LORAZEPAM 0.5 MG/1
TABLET ORAL
Qty: 20 TABLET | Refills: 0 | Status: SHIPPED | OUTPATIENT
Start: 2019-07-09 | End: 2019-12-17

## 2019-07-09 RX ORDER — BUSPIRONE HYDROCHLORIDE 15 MG/1
15 TABLET ORAL 2 TIMES DAILY
Qty: 180 TABLET | Refills: 3 | Status: SHIPPED | OUTPATIENT
Start: 2019-07-09 | End: 2020-04-23

## 2019-07-09 ASSESSMENT — ANXIETY QUESTIONNAIRES
7. FEELING AFRAID AS IF SOMETHING AWFUL MIGHT HAPPEN: SEVERAL DAYS
GAD7 TOTAL SCORE: 8
5. BEING SO RESTLESS THAT IT IS HARD TO SIT STILL: SEVERAL DAYS
4. TROUBLE RELAXING: SEVERAL DAYS
1. FEELING NERVOUS, ANXIOUS, OR ON EDGE: MORE THAN HALF THE DAYS
2. NOT BEING ABLE TO STOP OR CONTROL WORRYING: SEVERAL DAYS
6. BECOMING EASILY ANNOYED OR IRRITABLE: NOT AT ALL
GAD7 TOTAL SCORE: 8
7. FEELING AFRAID AS IF SOMETHING AWFUL MIGHT HAPPEN: SEVERAL DAYS
GAD7 TOTAL SCORE: 8
3. WORRYING TOO MUCH ABOUT DIFFERENT THINGS: MORE THAN HALF THE DAYS

## 2019-07-09 ASSESSMENT — PATIENT HEALTH QUESTIONNAIRE - PHQ9
SUM OF ALL RESPONSES TO PHQ QUESTIONS 1-9: 6
10. IF YOU CHECKED OFF ANY PROBLEMS, HOW DIFFICULT HAVE THESE PROBLEMS MADE IT FOR YOU TO DO YOUR WORK, TAKE CARE OF THINGS AT HOME, OR GET ALONG WITH OTHER PEOPLE: SOMEWHAT DIFFICULT
SUM OF ALL RESPONSES TO PHQ QUESTIONS 1-9: 6

## 2019-07-09 ASSESSMENT — MIFFLIN-ST. JEOR: SCORE: 1389.41

## 2019-07-09 NOTE — NURSING NOTE

## 2019-07-10 ASSESSMENT — PATIENT HEALTH QUESTIONNAIRE - PHQ9: SUM OF ALL RESPONSES TO PHQ QUESTIONS 1-9: 6

## 2019-07-10 ASSESSMENT — ANXIETY QUESTIONNAIRES: GAD7 TOTAL SCORE: 8

## 2019-07-11 ENCOUNTER — ALLIED HEALTH/NURSE VISIT (OUTPATIENT)
Dept: FAMILY MEDICINE | Facility: OTHER | Age: 44
End: 2019-07-11
Payer: COMMERCIAL

## 2019-07-11 DIAGNOSIS — Z11.1 VISIT FOR MANTOUX TEST: Primary | ICD-10-CM

## 2019-07-11 LAB
PPDINDURATION: 0 MM (ref 0–5)
PPDREDNESS: 0 MM

## 2019-07-11 PROCEDURE — 99207 ZZC NO CHARGE NURSE ONLY: CPT

## 2019-07-11 NOTE — PROGRESS NOTES
Mantoux result:  Lab Results   Component Value Date    PPDREDNESS 0 07/11/2019    PPDINDURATIO 0 07/11/2019     Is induration greater than 5mm?  Angy Quiñones, RN, BSN

## 2019-07-11 NOTE — LETTER
[facility]  [id number] [place] [id number] [phone]          7/11/2019          To Whom it May Concern:     Deo Hargrove, female, 1975 has had a mantoux on 7/9/2019.      Mantoux result is NEGATIVE:  Lab Results   Component Value Date    PPDREDNESS 0 07/11/2019    PPDINDURATIO 0 07/11/2019         Please contact me for questions or concerns.        Sincerely,      Sigrid Quiñones RN

## 2019-11-06 ENCOUNTER — HEALTH MAINTENANCE LETTER (OUTPATIENT)
Age: 44
End: 2019-11-06

## 2019-12-17 DIAGNOSIS — F41.1 GENERALIZED ANXIETY DISORDER: ICD-10-CM

## 2019-12-17 RX ORDER — LORAZEPAM 0.5 MG/1
TABLET ORAL
Qty: 20 TABLET | Refills: 0 | Status: SHIPPED | OUTPATIENT
Start: 2019-12-17 | End: 2020-02-17

## 2019-12-17 NOTE — TELEPHONE ENCOUNTER
Routing refill request to provider for review/approval because:  Drug not on the [facility] refill protocol     Last Written Prescription Date:  7/9/19  Last Fill Quantity: 20,  # refills: 0   Last office visit: 7/11/2019 with prescribing provider:     Future Office Visit:      Marlyn Galdamez, RN, BSN

## 2020-01-20 DIAGNOSIS — G47.00 INSOMNIA, UNSPECIFIED TYPE: ICD-10-CM

## 2020-01-21 RX ORDER — TEMAZEPAM 7.5 MG/1
CAPSULE ORAL
Qty: 30 CAPSULE | Refills: 1 | Status: SHIPPED | OUTPATIENT
Start: 2020-01-21 | End: 2020-03-05

## 2020-01-21 NOTE — TELEPHONE ENCOUNTER
Pending Prescriptions:                       Disp   Refills    temazepam (RESTORIL) 7.5 MG capsule [Pharm*30 cap*         Sig: TAKE 1 CAPSULE BY MOUTH AT BEDTIME AS NEEDED FOR           SLEEP    Routing refill request to provider for review/approval because:  Drug not on the [facility] refill protocol       Payton Wang, BSN, RN, PHN

## 2020-01-28 ENCOUNTER — TELEPHONE (OUTPATIENT)
Dept: FAMILY MEDICINE | Facility: OTHER | Age: 45
End: 2020-01-28

## 2020-01-28 DIAGNOSIS — H93.19 TINNITUS, UNSPECIFIED LATERALITY: ICD-10-CM

## 2020-01-28 DIAGNOSIS — H91.90 HEARING LOSS, UNSPECIFIED HEARING LOSS TYPE, UNSPECIFIED LATERALITY: Primary | ICD-10-CM

## 2020-01-28 NOTE — TELEPHONE ENCOUNTER
Reason for Call:  Other call back and referral    Detailed comments: Patient called clinic asking for a referral to the audiologist. She was supposed to follow up in 1 year. She has put this off and now she is trying to schedule with , but needs a referral from primary. Referral is for hearing loss and tinnitus.     Phone Number Patient can be reached at: Home number on file [phone] (home)    Best Time: any    Can we leave a detailed message on this number? YES    Call taken on 1/28/2020 at 11:23 AM by Ivan Meza

## 2020-01-29 NOTE — TELEPHONE ENCOUNTER
Left a detailed message on patient voicemail informing patient that a referral has been placed and patient can schedule an appointment at her convenience.

## 2020-02-16 DIAGNOSIS — F41.1 GENERALIZED ANXIETY DISORDER: ICD-10-CM

## 2020-02-17 RX ORDER — LORAZEPAM 0.5 MG/1
TABLET ORAL
Qty: 20 TABLET | Refills: 0 | Status: SHIPPED | OUTPATIENT
Start: 2020-02-17 | End: 2020-04-23

## 2020-02-18 NOTE — TELEPHONE ENCOUNTER
Pending Prescriptions:                       Disp   Refills    LORazepam (ATIVAN) 0.5 MG tablet [Pharmacy*20 tab*         Sig: TAKE 1 TABLET BY MOUTH EVERY DAY AS NEEDED    Routing refill request to provider for review/approval because:  Drug not on the [facility] refill protocol     Payton Wang, BSN, RN, PHN

## 2020-02-28 ENCOUNTER — TRANSFERRED RECORDS (OUTPATIENT)
Dept: HEALTH INFORMATION MANAGEMENT | Facility: CLINIC | Age: 45
End: 2020-02-28

## 2020-03-05 ENCOUNTER — MYC REFILL (OUTPATIENT)
Dept: FAMILY MEDICINE | Facility: OTHER | Age: 45
End: 2020-03-05

## 2020-03-05 DIAGNOSIS — G47.00 INSOMNIA, UNSPECIFIED TYPE: ICD-10-CM

## 2020-03-05 RX ORDER — TEMAZEPAM 7.5 MG/1
CAPSULE ORAL
Qty: 30 CAPSULE | Refills: 1 | Status: CANCELLED | OUTPATIENT
Start: 2020-03-05

## 2020-03-06 RX ORDER — TEMAZEPAM 7.5 MG/1
CAPSULE ORAL
Qty: 30 CAPSULE | Refills: 1 | Status: SHIPPED | OUTPATIENT
Start: 2020-03-06 | End: 2020-05-29

## 2020-03-06 NOTE — TELEPHONE ENCOUNTER
Pending Prescriptions:                       Disp   Refills    temazepam (RESTORIL) 7.5 MG capsule       30 cap*1          Sent 1/21/20 with 2 month supply. Refill not appropriate at this time.     Sigrid Quiñones, MSN, RN

## 2020-03-06 NOTE — TELEPHONE ENCOUNTER
Pending Prescriptions:                       Disp   Refills    temazepam (RESTORIL) 7.5 MG capsule        30 cap*1          Last Written Prescription Date:  1/21/20  Last Fill Quantity: 30,  # refills: 1   Last office visit: 7/11/2019 with prescribing provider:     Future Office Visit:      Routing refill request to provider for review/approval because:  Drug not on the [facility] refill protocol     Sigrid Quiñones, MSN, RN

## 2020-04-23 ENCOUNTER — VIRTUAL VISIT (OUTPATIENT)
Dept: FAMILY MEDICINE | Facility: OTHER | Age: 45
End: 2020-04-23
Payer: COMMERCIAL

## 2020-04-23 DIAGNOSIS — G47.00 INSOMNIA, UNSPECIFIED TYPE: ICD-10-CM

## 2020-04-23 DIAGNOSIS — F33.0 MILD RECURRENT MAJOR DEPRESSION (H): ICD-10-CM

## 2020-04-23 DIAGNOSIS — F41.1 GENERALIZED ANXIETY DISORDER: Primary | ICD-10-CM

## 2020-04-23 DIAGNOSIS — A08.4 VIRAL GASTROENTERITIS: ICD-10-CM

## 2020-04-23 PROCEDURE — 99214 OFFICE O/P EST MOD 30 MIN: CPT | Mod: TEL | Performed by: PHYSICIAN ASSISTANT

## 2020-04-23 RX ORDER — BUSPIRONE HYDROCHLORIDE 10 MG/1
20 TABLET ORAL 2 TIMES DAILY
Qty: 120 TABLET | Refills: 5 | Status: SHIPPED | OUTPATIENT
Start: 2020-04-23

## 2020-04-23 RX ORDER — HYDROXYZINE HYDROCHLORIDE 25 MG/1
TABLET, FILM COATED ORAL
Qty: 30 TABLET | Refills: 1 | Status: SHIPPED | OUTPATIENT
Start: 2020-04-23

## 2020-04-23 RX ORDER — LORAZEPAM 0.5 MG/1
TABLET ORAL
Qty: 20 TABLET | Refills: 0 | Status: CANCELLED | OUTPATIENT
Start: 2020-04-23

## 2020-04-23 ASSESSMENT — ANXIETY QUESTIONNAIRES
GAD7 TOTAL SCORE: 14
6. BECOMING EASILY ANNOYED OR IRRITABLE: SEVERAL DAYS
1. FEELING NERVOUS, ANXIOUS, OR ON EDGE: NEARLY EVERY DAY
7. FEELING AFRAID AS IF SOMETHING AWFUL MIGHT HAPPEN: SEVERAL DAYS
3. WORRYING TOO MUCH ABOUT DIFFERENT THINGS: NEARLY EVERY DAY
IF YOU CHECKED OFF ANY PROBLEMS ON THIS QUESTIONNAIRE, HOW DIFFICULT HAVE THESE PROBLEMS MADE IT FOR YOU TO DO YOUR WORK, TAKE CARE OF THINGS AT HOME, OR GET ALONG WITH OTHER PEOPLE: SOMEWHAT DIFFICULT
5. BEING SO RESTLESS THAT IT IS HARD TO SIT STILL: NOT AT ALL
2. NOT BEING ABLE TO STOP OR CONTROL WORRYING: NEARLY EVERY DAY

## 2020-04-23 ASSESSMENT — PATIENT HEALTH QUESTIONNAIRE - PHQ9
5. POOR APPETITE OR OVEREATING: NEARLY EVERY DAY
SUM OF ALL RESPONSES TO PHQ QUESTIONS 1-9: 5

## 2020-04-23 NOTE — PATIENT INSTRUCTIONS
Will increase the Buspar to 20 mg twice daily.  Will try hydroxyzine as needed for anxiety instead of Ativan. I also recommend trying this for sleep instead of the Restoril.    Your abdominal symptoms are consistent with a viral gastroenteritis.  I recommend a bland diet with plenty of fluids.  If not improving, please contact the clinic.    Follow up in 1 month with your primary care provider.

## 2020-04-23 NOTE — PROGRESS NOTES
"Deo Hargrove is a 44 year old female who is being evaluated via a billable telephone visit.      The patient has been notified of following:     \"This telephone visit will be conducted via a call between you and your physician/provider. We have found that certain health care needs can be provided without the need for a physical exam.  This service lets us provide the care you need with a short phone conversation.  If a prescription is necessary we can send it directly to your pharmacy.  If lab work is needed we can place an order for that and you can then stop by our lab to have the test done at a later time.    Telephone visits are billed at different rates depending on your insurance coverage. During this emergency period, for some insurers they may be billed the same as an in-person visit.  Please reach out to your insurance provider with any questions.    If during the course of the call the physician/provider feels a telephone visit is not appropriate, you will not be charged for this service.\"    Patient has given verbal consent for Telephone visit?  Yes    How would you like to obtain your AVS? E-Mail (inform patient AVS not encrypted)  [email or web address]    Subjective     Deo Hargrove is a 44 year old female who presents to clinic today for the following health issues:    HPI  Depression and Anxiety Follow-Up    How are you doing with your depression since your last visit? Improved     How are you doing with your anxiety since your last visit?  Worsened     Are you having other symptoms that might be associated with depression or anxiety? No    Have you had a significant life event? No     Do you have any concerns with your use of alcohol or other drugs? No    She stopped the Prozac in December and depression has been stable but anxiety has been worse. She has been worried about a lot of things and cannot shut her mind off. 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 She takes Ativan as needed when " anxiety worsens, maybe 1-2 times per week. She also remains on Restoril for insomnia. She has cut back to 7.5 mg nightly and has tried going off completely but had worsening insomnia.       Social History     Tobacco Use     Smoking status: Never Smoker     Smokeless tobacco: Never Used   Substance Use Topics     Alcohol use: Yes     Comment: occasional     Drug use: No     PHQ 10/9/2018 7/9/2019 4/23/2020   PHQ-9 Total Score 5 6 5   Q9: Thoughts of better off dead/self-harm past 2 weeks Not at all Not at all Not at all     BONNIE-7 SCORE 10/9/2018 7/9/2019 4/23/2020   Total Score - - -   Total Score 1 (minimal anxiety) 8 (mild anxiety) -   Total Score 1 8 14       Suicide Assessment Five-step Evaluation and Treatment (SAFE-T)      How many servings of fruits and vegetables do you eat daily?  2-3    On average, how many sweetened beverages do you drink each day (Examples: soda, juice, sweet tea, etc.  Do NOT count diet or artificially sweetened beverages)?   0    How many days per week do you exercise enough to make your heart beat faster? 3 or less    How many minutes a day do you exercise enough to make your heart beat faster? 20 - 29    How many days per week do you miss taking your medication? 0    ABDOMINAL   PAIN     Onset: Sunday     Description:   Character: Dull ache  Location: suprapubic region  Radiation: Back    Intensity: moderate    Progression of Symptoms:  intermittent    Accompanying Signs & Symptoms:  Fever/Chills?: no   Gas/Bloating: YES  Nausea: YES  Vomitting: no   Diarrhea?: YES  Constipation:no   Dysuria or Hematuria: no    History:   Trauma: no   Previous similar pain: no    Previous tests done: none    Precipitating factors:   Does the pain change with:     Food: YES     BM: no     Urination: no     Alleviating factors:      Therapies Tried and outcome: Pepto and Ibuprofen     LMP:  IUD        Since Sunday, she has been experiencing diarrhea, intermittent nausea, and abdominal cramping. She  bought some wings from the store which she ate on Sunday but this is the only new food she has eaten. Nobody else in her family became sick after eating the wings. Her symptoms seems to be improving through yesterday but then she at some steak and potatoes last night and has cramping and diarrhea again this morning. She denies fevers, chills, vomiting or change in stool color. No cough, nasal congestion, or shortness of breath. No known sick exposure. She has been staying home besides going to the store of the past month. Pain is equal throughout the abdomen. She has been using heating pads at night with some relief.     Reviewed and updated as needed this visit by Provider  Allergies  Meds  Problems     Review of Systems   ROS COMP: Constitutional, HEENT, cardiovascular, pulmonary, gi and gu systems are negative, except as otherwise noted.       Objective   Reported vitals:  There were no vitals taken for this visit.   General: alert and no distress over the phone  PSYCH: Alert and oriented times 3; coherent speech, normal   rate and volume, able to articulate logical thoughts, able   to abstract reason, no tangential thoughts, no hallucinations   or delusions  Her affect is normal  RESP: No cough, no audible wheezing, able to talk in full sentences  Remainder of exam unable to be completed due to telephone visits      Assessment/Plan:    ICD-10-CM    1. Generalized anxiety disorder  F41.1 busPIRone (BUSPAR) 10 MG tablet     hydrOXYzine (ATARAX) 25 MG tablet   2. Mild recurrent major depression (H)  F33.0    3. Insomnia, unspecified insomnia  G47.00 hydrOXYzine (ATARAX) 25 MG tablet   4. Viral gastroenteritis  A08.4        1-3. Her depression remains well controlled off the Prozac but anxiety has worsened over the past few months. She would like to avoid restarting Prozac but is interested in increasing the Buspar so will increase to 20 mg twice daily. She has tolerated this medication well. I also recommend  trying hydroxyzine to take as needed for anxiety and insomnia. I discussed common side effects and that this does not have the potential for addiction like the Restoril and Ativan. Will not refill the Ativan or Restoril at this time but if hydroxyzine is not helping, will consider continuing Restoril but would like to find an alternative to Ativan as I do not recommend she be on Ativan and Restoril as these are the same type of medication. Will plan on recheck with her PCP in 1 month. She is in agreement with this plan.     4. Her abdominal symptoms including cramping and diarrhea are consistent with a viral gastroenteritis. It could also be food borne but I would have expected this to have resolved by now. No concerns for appendicitis or serious bacterial infection given lack of fevers and lack of localized pain. I recommend supportive cares including a bland diet, plenty of fluids and continued heating pad as needed for cramping. She can also consider ibuprofen and Tylenol as needed for pain. If symptoms are worsening or not improving, she will contact the clinic.       Phone call duration:  14 minutes    Carlyle Garcia PA-C

## 2020-04-24 ASSESSMENT — ANXIETY QUESTIONNAIRES: GAD7 TOTAL SCORE: 14

## 2020-04-29 ENCOUNTER — ALLIED HEALTH/NURSE VISIT (OUTPATIENT)
Dept: FAMILY MEDICINE | Facility: OTHER | Age: 45
End: 2020-04-29
Payer: COMMERCIAL

## 2020-04-29 DIAGNOSIS — Z11.1 VISIT FOR MANTOUX TEST: Primary | ICD-10-CM

## 2020-04-29 PROCEDURE — 86580 TB INTRADERMAL TEST: CPT

## 2020-04-29 PROCEDURE — 99207 ZZC NO CHARGE NURSE ONLY: CPT

## 2020-04-29 ASSESSMENT — PATIENT HEALTH QUESTIONNAIRE - PHQ9
5. POOR APPETITE OR OVEREATING: MORE THAN HALF THE DAYS
SUM OF ALL RESPONSES TO PHQ QUESTIONS 1-9: 5

## 2020-04-29 ASSESSMENT — ANXIETY QUESTIONNAIRES
7. FEELING AFRAID AS IF SOMETHING AWFUL MIGHT HAPPEN: SEVERAL DAYS
6. BECOMING EASILY ANNOYED OR IRRITABLE: SEVERAL DAYS
5. BEING SO RESTLESS THAT IT IS HARD TO SIT STILL: NOT AT ALL
GAD7 TOTAL SCORE: 11
3. WORRYING TOO MUCH ABOUT DIFFERENT THINGS: MORE THAN HALF THE DAYS
1. FEELING NERVOUS, ANXIOUS, OR ON EDGE: NEARLY EVERY DAY
2. NOT BEING ABLE TO STOP OR CONTROL WORRYING: MORE THAN HALF THE DAYS
IF YOU CHECKED OFF ANY PROBLEMS ON THIS QUESTIONNAIRE, HOW DIFFICULT HAVE THESE PROBLEMS MADE IT FOR YOU TO DO YOUR WORK, TAKE CARE OF THINGS AT HOME, OR GET ALONG WITH OTHER PEOPLE: NOT DIFFICULT AT ALL

## 2020-04-29 NOTE — PROGRESS NOTES
The patient is asked the following questions today and these are her answers:    -Have you had a mantoux administered in the past 30 days?    No  -Have you had a previous positive Mantoux.  No  -Have you received BCG in the past.  No  -Have you had a live vaccine  (MMR, Varicella, OPV, Yellow Fever) in the last 6 weeks.  No  -Have you had and active  viral or bacterial infection in the past 6 weeks.  No  -Have you received corticosteroids or immunosuppressive agents in the past 6 weeks.  No  -Have you been diagnosed with HIV?  No  -Do you have a malignancy?  No    Mantoux Questionnaire: answers were all negative.        Patient here for TB test placement due to work requirements.     Roly Saldana MA on 4/29/2020 at 9:35 AM

## 2020-04-30 ASSESSMENT — ANXIETY QUESTIONNAIRES: GAD7 TOTAL SCORE: 11

## 2020-05-01 ENCOUNTER — ALLIED HEALTH/NURSE VISIT (OUTPATIENT)
Dept: FAMILY MEDICINE | Facility: OTHER | Age: 45
End: 2020-05-01
Payer: COMMERCIAL

## 2020-05-01 DIAGNOSIS — Z11.1 SCREENING EXAMINATION FOR PULMONARY TUBERCULOSIS: Primary | ICD-10-CM

## 2020-05-01 LAB
PPDINDURATION: 0 MM (ref 0–5)
PPDREDNESS: 0 MM

## 2020-05-01 PROCEDURE — 99207 ZZC NO CHARGE NURSE ONLY: CPT

## 2020-05-01 NOTE — PROGRESS NOTES
Patient here for mantoux read at 2:37 pm    Mantoux result:  Lab Results   Component Value Date    PPDREDNESS 0 05/01/2020    PPDINDURATIO 0 05/01/2020     Is induration greater than 5mm?  No     Letter given.    Michoacano Kelley, RN, BSN

## 2020-05-01 NOTE — LETTER
[address]  [facility] [address]  [phone]          5/1/2020          To Whom it May Concern:     Deo Hargrove, female, 1975 has had a mantoux on 04/29/2020 at 09:30 am on right forearm.       Mantoux result is NEGATIVE read by RN at 2:37pm on 05/01/2020:  Lab Results   Component Value Date    PPDREDNESS 0 05/01/2020    PPDINDURATIO 0 05/01/2020         Please contact me for questions or concerns.        Sincerely,    Michoacano Kelley, RN, BSN

## 2020-05-11 NOTE — PROGRESS NOTES
"Deo Hargrove is a 44 year old female who is being evaluated via a billable telephone visit.      The patient has been notified of following:     \"This telephone visit will be conducted via a call between you and your physician/provider. We have found that certain health care needs can be provided without the need for a physical exam.  This service lets us provide the care you need with a short phone conversation.  If a prescription is necessary we can send it directly to your pharmacy.  If lab work is needed we can place an order for that and you can then stop by our lab to have the test done at a later time.    Telephone visits are billed at different rates depending on your insurance coverage. During this emergency period, for some insurers they may be billed the same as an in-person visit.  Please reach out to your insurance provider with any questions.    If during the course of the call the physician/provider feels a telephone visit is not appropriate, you will not be charged for this service.\"    Patient has given verbal consent for Telephone visit?  Yes    What phone number would you like to be contacted at? [phone]    How would you like to obtain your AVS? MyChart    Subjective     Deo Hargrove is a 44 year old female who presents to clinic today for the following health issues:    HPI  Anxiety Follow-Up    How are you doing with your anxiety since your last visit? Improved think its getting better with the increase dose of buspar.     Are you having other symptoms that might be associated with anxiety? No    Have you had a significant life event? No     Are you feeling depressed? No    Do you have any concerns with your use of alcohol or other drugs? No     Vistaril - tried this for sleep. Every spring goes in sun gets sun rash. She took it for a few nights to help with sleep and was in the sun 1.5 days later and broke out \"way worse\" and was \"covered in hives\" on sun exposed area, was very " itchy. She has had a sun rash since she was young. She also felt her lymph nodes were big.     She tried Ambien in the past but he will get up 2 hours after she takes it and eat a bunch.     She increased her buspirone at last visit and feels it has been helping some. Improved but she feels there is still room for improvement in anxiety overall. Still having panic attacks 2-4 times a month. No side effects.    She is on a high dose of magnesium for migraines/headaches and now hardly ever takes tramadol    She was on amitriptyline in the past for migraines and stopped it because it was not working for migraines. Has not tried trazodone. Has not seen a sleep specialist.         Social History     Tobacco Use     Smoking status: Never Smoker     Smokeless tobacco: Never Used   Substance Use Topics     Alcohol use: Yes     Comment: occasional     Drug use: No     BONNIE-7 SCORE 4/23/2020 4/29/2020 5/14/2020   Total Score - - -   Total Score - - -   Total Score 14 11 9     PHQ 4/23/2020 4/29/2020 5/14/2020   PHQ-9 Total Score 5 5 4   Q9: Thoughts of better off dead/self-harm past 2 weeks Not at all Not at all Not at all     Last PHQ-9 5/14/2020   1.  Little interest or pleasure in doing things 0   2.  Feeling down, depressed, or hopeless 0   3.  Trouble falling or staying asleep, or sleeping too much 3   4.  Feeling tired or having little energy 0   5.  Poor appetite or overeating 0   6.  Feeling bad about yourself 0   7.  Trouble concentrating 1   8.  Moving slowly or restless 0   Q9: Thoughts of better off dead/self-harm past 2 weeks 0   PHQ-9 Total Score 4   Difficulty at work, home, or with people Not difficult at all     BONNIE-7  5/14/2020   1. Feeling nervous, anxious, or on edge 1   2. Not being able to stop or control worrying 1   3. Worrying too much about different things 2   4. Trouble relaxing 3   5. Being so restless that it is hard to sit still 0   6. Becoming easily annoyed or irritable 0   7. Feeling afraid,  as if something awful might happen 2   BONNIE-7 Total Score 9   If you checked any problems, how difficult have they made it for you to do your work, take care of things at home, or get along with other people? Not difficult at all         How many servings of fruits and vegetables do you eat daily?  0-1    On average, how many sweetened beverages do you drink each day (Examples: soda, juice, sweet tea, etc.  Do NOT count diet or artificially sweetened beverages)?   0    How many days per week do you exercise enough to make your heart beat faster? 3 or less    How many minutes a day do you exercise enough to make your heart beat faster? 10 - 19    How many days per week do you miss taking your medication? 0          Patient Active Problem List   Diagnosis     Migraine headache     Mild recurrent major depression (H)     Generalized anxiety disorder     Insomnia     Past Surgical History:   Procedure Laterality Date      SECTION       GYN SURGERY      had LT tube removed       Social History     Tobacco Use     Smoking status: Never Smoker     Smokeless tobacco: Never Used   Substance Use Topics     Alcohol use: Yes     Comment: occasional     Family History   Problem Relation Age of Onset     Hypertension Mother      Lipids Mother      Alcohol/Drug Father      Breast Cancer Maternal Grandmother      Breast Cancer Paternal Grandmother          Current Outpatient Medications   Medication Sig Dispense Refill     busPIRone (BUSPAR) 10 MG tablet Take 2 tablets (20 mg) by mouth 2 times daily 120 tablet 5     busPIRone HCl (BUSPAR) 30 MG tablet Take 1 tablet (30 mg) by mouth 2 times daily 60 tablet 0     carBAMazepine (CARBATROL) 200 MG 12 hr capsule Take 1 capsule (200 mg) by mouth daily And twice daily if needed       hydrOXYzine (ATARAX) 25 MG tablet Take 1 tablet as needed for anxiety. Can take 1-3 tablets nightly as needed for sleep. 30 tablet 1     LORazepam (ATIVAN) 0.5 MG tablet Take 1 tablet (0.5 mg) by mouth  daily as needed for anxiety 5 tablet 0     Magnesium-Calcium-Folic Acid (MAGNEBIND 400 PO)        topiramate (TOPAMAX) 100 MG tablet Take 1 tablet (100 mg) by mouth 2 times daily Prescribed by Neurologist 60 tablet 1     traMADol (ULTRAM) 50 MG tablet TK 1 T PO  AT THE ONSET OF MIGRAINE. CAN REPEAT ONCE AFTER 2 HOURS IF NEEDED  1     traZODone (DESYREL) 50 MG tablet Take 1-2 tablets ( mg) by mouth At Bedtime 60 tablet 0     LORazepam (ATIVAN) 0.5 MG tablet TK 1 T PO QD PRN       temazepam (RESTORIL) 7.5 MG capsule TAKE 1 CAPSULE BY MOUTH AT BEDTIME AS NEEDED FOR SLEEP (Patient not taking: Reported on 5/14/2020) 30 capsule 1     Allergies   Allergen Reactions     Penicillins      Triptans [Sumatriptan]      Tightness of the throat     BP Readings from Last 3 Encounters:   07/09/19 92/62   01/21/19 99/65   10/09/18 108/72    Wt Readings from Last 3 Encounters:   07/09/19 75.5 kg (166 lb 6.4 oz)   01/21/19 74.8 kg (165 lb)   04/27/18 75.5 kg (166 lb 8 oz)                    Reviewed and updated as needed this visit by Provider         Review of Systems   Constitutional, HEENT, cardiovascular, pulmonary, gi and gu systems are negative, except as otherwise noted.       Objective   Reported vitals:  There were no vitals taken for this visit.   alert and no distress  PSYCH: Alert and oriented times 3; coherent speech, normal   rate and volume, able to articulate logical thoughts, able   to abstract reason, no tangential thoughts, no hallucinations   or delusions   RESP: No cough, no audible wheezing, able to talk in full sentences  Remainder of exam unable to be completed due to telephone visits    Diagnostic Test Results:  Labs reviewed in Epic        Assessment/Plan:  1. Insomnia, unspecified type  Hydroxyzine she feels caused her to have worsened sun rash. I explained that hydroxyzine is an antihistamine so I wouldn't think this could cause it particularly 1 1/2 days after she took the dose. She does not want to  try it again. Discussed options of amitriptyline or trazodone. Amitriptyline has potential interaction with carbamazepine so will start trial of trazodone. If fails trazodone will talk to her neurologist and the pharmacist if amitriptyline can be an option if we monitor. Also recommended she see sleep specialist and she is agreeable to setting up an appointment to see if a sleep study would be beneficial or to help find a medication that would work well other than a benzo or Ambien which caused her to sleep eat. She will follow up with me on 2 weeks.   - SLEEP EVALUATION & MANAGEMENT REFERRAL - [facility] [phone] (Age 13 and up if over 100 lbs); Future  - traZODone (DESYREL) 50 MG tablet; Take 1-2 tablets ( mg) by mouth At Bedtime  Dispense: 60 tablet; Refill: 0    2. Anxiety  Improved but she feels there is still room for improvement in anxiety overall. Still having panic attacks 2-4 times a month. Increase dose of buspirone and follow up in 2 weeks.   - busPIRone HCl (BUSPAR) 30 MG tablet; Take 1 tablet (30 mg) by mouth 2 times daily  Dispense: 60 tablet; Refill: 0    3. Generalized anxiety disorder  Gave her a small amount of lorazepam to use only for panic attacks.   - LORazepam (ATIVAN) 0.5 MG tablet; Take 1 tablet (0.5 mg) by mouth daily as needed for anxiety  Dispense: 5 tablet; Refill: 0    No follow-ups on file.      Phone call duration:  19 minutes    LARRY Brooks CNP

## 2020-05-14 ENCOUNTER — VIRTUAL VISIT (OUTPATIENT)
Dept: FAMILY MEDICINE | Facility: OTHER | Age: 45
End: 2020-05-14
Payer: COMMERCIAL

## 2020-05-14 DIAGNOSIS — G47.00 INSOMNIA, UNSPECIFIED TYPE: Primary | ICD-10-CM

## 2020-05-14 DIAGNOSIS — F41.1 GENERALIZED ANXIETY DISORDER: ICD-10-CM

## 2020-05-14 DIAGNOSIS — F41.9 ANXIETY: ICD-10-CM

## 2020-05-14 PROCEDURE — 96127 BRIEF EMOTIONAL/BEHAV ASSMT: CPT | Mod: TEL | Performed by: STUDENT IN AN ORGANIZED HEALTH CARE EDUCATION/TRAINING PROGRAM

## 2020-05-14 PROCEDURE — 99214 OFFICE O/P EST MOD 30 MIN: CPT | Mod: TEL | Performed by: STUDENT IN AN ORGANIZED HEALTH CARE EDUCATION/TRAINING PROGRAM

## 2020-05-14 RX ORDER — LORAZEPAM 0.5 MG/1
0.5 TABLET ORAL DAILY PRN
Qty: 5 TABLET | Refills: 0 | Status: SHIPPED | OUTPATIENT
Start: 2020-05-14

## 2020-05-14 RX ORDER — LORAZEPAM 0.5 MG/1
TABLET ORAL
COMMUNITY
Start: 2020-02-17

## 2020-05-14 RX ORDER — BUSPIRONE HYDROCHLORIDE 30 MG/1
30 TABLET ORAL 2 TIMES DAILY
Qty: 60 TABLET | Refills: 0 | Status: SHIPPED | OUTPATIENT
Start: 2020-05-14

## 2020-05-14 RX ORDER — TRAZODONE HYDROCHLORIDE 50 MG/1
50-100 TABLET, FILM COATED ORAL AT BEDTIME
Qty: 60 TABLET | Refills: 0 | Status: SHIPPED | OUTPATIENT
Start: 2020-05-14

## 2020-05-14 ASSESSMENT — ANXIETY QUESTIONNAIRES
1. FEELING NERVOUS, ANXIOUS, OR ON EDGE: SEVERAL DAYS
2. NOT BEING ABLE TO STOP OR CONTROL WORRYING: SEVERAL DAYS
6. BECOMING EASILY ANNOYED OR IRRITABLE: NOT AT ALL
3. WORRYING TOO MUCH ABOUT DIFFERENT THINGS: MORE THAN HALF THE DAYS
IF YOU CHECKED OFF ANY PROBLEMS ON THIS QUESTIONNAIRE, HOW DIFFICULT HAVE THESE PROBLEMS MADE IT FOR YOU TO DO YOUR WORK, TAKE CARE OF THINGS AT HOME, OR GET ALONG WITH OTHER PEOPLE: NOT DIFFICULT AT ALL
GAD7 TOTAL SCORE: 9
5. BEING SO RESTLESS THAT IT IS HARD TO SIT STILL: NOT AT ALL
7. FEELING AFRAID AS IF SOMETHING AWFUL MIGHT HAPPEN: MORE THAN HALF THE DAYS

## 2020-05-14 ASSESSMENT — PATIENT HEALTH QUESTIONNAIRE - PHQ9
SUM OF ALL RESPONSES TO PHQ QUESTIONS 1-9: 4
5. POOR APPETITE OR OVEREATING: NEARLY EVERY DAY

## 2020-05-15 ASSESSMENT — ANXIETY QUESTIONNAIRES: GAD7 TOTAL SCORE: 9

## 2020-05-29 ENCOUNTER — VIRTUAL VISIT (OUTPATIENT)
Dept: SLEEP MEDICINE | Facility: CLINIC | Age: 45
End: 2020-05-29
Attending: STUDENT IN AN ORGANIZED HEALTH CARE EDUCATION/TRAINING PROGRAM
Payer: COMMERCIAL

## 2020-05-29 DIAGNOSIS — G47.00 INSOMNIA, UNSPECIFIED TYPE: Primary | ICD-10-CM

## 2020-05-29 PROCEDURE — 99214 OFFICE O/P EST MOD 30 MIN: CPT | Mod: GT | Performed by: PHYSICIAN ASSISTANT

## 2020-05-29 RX ORDER — CALCIUM CARBONATE 300MG(750)
400 TABLET,CHEWABLE ORAL
COMMUNITY
End: 2022-03-23

## 2020-05-29 NOTE — PROGRESS NOTES
"Deo Hargrove is a 44 year old female who is being evaluated via a billable video visit.      The patient has been notified of following:     \"This video visit will be conducted via a call between you and your physician/provider. We have found that certain health care needs can be provided without the need for an in-person physical exam.  This service lets us provide the care you need with a video conversation.  If a prescription is necessary we can send it directly to your pharmacy.  If lab work is needed we can place an order for that and you can then stop by our lab to have the test done at a later time.    Video visits are billed at different rates depending on your insurance coverage.  Please reach out to your insurance provider with any questions.    If during the course of the call the physician/provider feels a video visit is not appropriate, you will not be charged for this service.\"    Patient has given verbal consent for Video visit? Yes    How would you like to obtain your AVS? [facility]    Patient would like the video invitation sent by: Text to cell phone: [phone]    Will anyone else be joining your video visit? No        Video-Visit Details    Type of service:  Video Visit    Video Start Time: 1516  Video End Time: 1551    Originating Location (pt. Location): Home    Distant Location (provider location):  [facility]     Platform used for Video Visit: Janki Muñoz PA-C  Does Deo have a CPAP/Bipap?  No     [place] Sleep Scale: 3    Sleep Consultation:    Date on this visit: 5/29/2020    Deo Hargrove is sent by Rosalinda Chisholm* for a sleep consultation regarding Insomnia.    Primary Physician: Rosalinda Ho     Deo Hargrove reports Insomnia for 6-7 years.     Deo goes to sleep at 9:30 PM during the week. She wakes up at 7:30 AM with an alarm. She falls asleep in 30-60 minutes.  Deo has difficulty falling asleep.  She wakes up " 2-4 times a night for 5-30 minutes before falling back to sleep.  Deo wakes up to go to the bathroom.   Patient gets an average of 6-7 hours of sleep per night.     Patient does use electronics in bed and read in bed.     Deo does not do shift work.  She works day shifts.      Deo does snore some nights. Patient does have a regular bed partner. There is report of snoring.  She does not have witnessed apneas. They never sleep separately.  Patient sleeps on her side. She denies no sleep disturbance, snort arousals, morning dry mouth, morning headaches and morning confusion. Occasional restless legs which is new. Deo denies any bruxism, sleep walking, sleep talking, dream enactment, sleep paralysis, cataplexy and hypnogogic/hypnopompic hallucinations.    She denies sleep walking, sleep talking, enuresis and sleep terrors as a child.  Deo has claustrophobia and depression.      Deo has gained 0-5 pounds in the last year.  Patient describes themself as neither a morning or night person.  She would prefer to go to sleep at 11:00 PM and wake up at 9:00 AM.  Patient's [place] Sleepiness score 3/24 consistent with no daytime sleepiness.      Deo naps 1-2 times per month for 30-60 minutes, feels refreshed after naps. She takes no inadvertant naps.  She admits dozing while driving. The most recent episode was 4 month(s) ago.  Patient was counseled on the importance of driving while alert, to pull over if drowsy, or nap before getting into the vehicle if sleepy.  She uses 2 cups/day of coffee. Last caffeine intake is usually before noon.    Allergies:    Allergies   Allergen Reactions     Penicillins      Triptans [Sumatriptan]      Tightness of the throat       Medications:    Current Outpatient Medications   Medication Sig Dispense Refill     busPIRone (BUSPAR) 10 MG tablet Take 2 tablets (20 mg) by mouth 2 times daily 120 tablet 5     busPIRone HCl (BUSPAR) 30 MG tablet Take 1 tablet (30 mg) by  mouth 2 times daily 60 tablet 0     carBAMazepine (CARBATROL) 200 MG 12 hr capsule Take 1 capsule (200 mg) by mouth daily And twice daily if needed       hydrOXYzine (ATARAX) 25 MG tablet Take 1 tablet as needed for anxiety. Can take 1-3 tablets nightly as needed for sleep. 30 tablet 1     LORazepam (ATIVAN) 0.5 MG tablet TK 1 T PO QD PRN       LORazepam (ATIVAN) 0.5 MG tablet Take 1 tablet (0.5 mg) by mouth daily as needed for anxiety 5 tablet 0     Magnesium-Calcium-Folic Acid (MAGNEBIND 400 PO)        temazepam (RESTORIL) 7.5 MG capsule TAKE 1 CAPSULE BY MOUTH AT BEDTIME AS NEEDED FOR SLEEP (Patient not taking: Reported on 2020) 30 capsule 1     topiramate (TOPAMAX) 100 MG tablet Take 1 tablet (100 mg) by mouth 2 times daily Prescribed by Neurologist 60 tablet 1     traMADol (ULTRAM) 50 MG tablet TK 1 T PO  AT THE ONSET OF MIGRAINE. CAN REPEAT ONCE AFTER 2 HOURS IF NEEDED  1     traZODone (DESYREL) 50 MG tablet Take 1-2 tablets ( mg) by mouth At Bedtime 60 tablet 0       Problem List:  Patient Active Problem List    Diagnosis Date Noted     Insomnia 2014     Priority: Medium     Generalized anxiety disorder 12/10/2012     Priority: Medium     Diagnosis updated by automated process. Provider to review and confirm.       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     Priority: Medium     Migraine headache 2012     Priority: Medium        Past Medical/Surgical History:  Past Medical History:   Diagnosis Date     Anxiety      Headaches, migraine      Kidney stone      Past Surgical History:   Procedure Laterality Date      SECTION       GYN SURGERY      had LT tube removed       Social History:  Social History     Socioeconomic History     Marital status:      Spouse name: Not on file     Number of children: Not on file     Years of education: Not on file     Highest education level: Not on file   Occupational History     Not on file   Social Needs     Financial resource  strain: Not on file     Food insecurity     Worry: Not on file     Inability: Not on file     Transportation needs     Medical: Not on file     Non-medical: Not on file   Tobacco Use     Smoking status: Never Smoker     Smokeless tobacco: Never Used   Substance and Sexual Activity     Alcohol use: Yes     Comment: occasional     Drug use: No     Sexual activity: Yes     Partners: Male     Birth control/protection: I.U.D.   Lifestyle     Physical activity     Days per week: Not on file     Minutes per session: Not on file     Stress: Not on file   Relationships     Social connections     Talks on phone: Not on file     Gets together: Not on file     Attends Sikhism service: Not on file     Active member of club or organization: Not on file     Attends meetings of clubs or organizations: Not on file     Relationship status: Not on file     Intimate partner violence     Fear of current or ex partner: Not on file     Emotionally abused: Not on file     Physically abused: Not on file     Forced sexual activity: Not on file   Other Topics Concern     Parent/sibling w/ CABG, MI or angioplasty before 65F 55M? Yes     Comment: Maternal Grandfather   Social History Narrative     Not on file         Impression/Plan:    Insomnia- patient has been off Temazepam for a month. Trazodone at 100 MG has been beneficial, but leaves her with a hangover effect. Would discourage further use of Temazepam due to potential for abuse/dependence. Try meditation for Insomnia. Referred to [facility]  Literature provided regarding sleep hygiene and insomnia.      She will follow up with me in approximately two weeks after her sleep study has been competed to review the results and discuss plan of care.       Polysomnography reviewed.  Obstructive sleep apnea reviewed.  Complications of untreated sleep apnea were reviewed.      CC: Rosalinda Chisholm*

## 2020-08-21 ENCOUNTER — TRANSFERRED RECORDS (OUTPATIENT)
Dept: HEALTH INFORMATION MANAGEMENT | Facility: CLINIC | Age: 45
End: 2020-08-21

## 2020-09-10 ENCOUNTER — TRANSFERRED RECORDS (OUTPATIENT)
Dept: HEALTH INFORMATION MANAGEMENT | Facility: CLINIC | Age: 45
End: 2020-09-10

## 2020-11-29 ENCOUNTER — HEALTH MAINTENANCE LETTER (OUTPATIENT)
Age: 45
End: 2020-11-29

## 2021-06-22 NOTE — PROGRESS NOTES
"Chief Complaint   Patient presents with     RECHECK     some relief from new shoes, voltaren, Night Splint, waiting for orthoics to arrive, pain 2, now radiates up leg or to toes w/numbness, instead of shooting pain in one area, also NO tightness w/first steps after lying or sitting - Right heel plantar fasciitis and achilles tendonitis, onset Aug 2017; LOV 11/13/2017       Weight management plan: Patient was referred to their PCP to discuss a diet and exercise plan.     HPI:  Deo Hargrove is a 42 year old female who is seen in consultation at the request of Rosalinda Ho, APRN CNP.    Pt presents for eval of:   (Onset, Location, L/R, Character, Treatments, Injury if yes)    XRAY Right calcaneus 11/3/2017     Plantar Right heel pain started after moving in Aug 2017 while wearing flip flops. Presents today with [employer] slip on shoes.  Sharp, stabbing, burning, dull ache, numbness, pain 2-7, hard to get comfortable while lying down, tightness with first steps after lying or sitting, lateral Right ankle swelling  Ibuprofen, stretching, ice, hot tub soaking, rest, elevation, ankle compression sleeve    Works as a Nurse Manager.     EXAM:Vitals: Temp 97.8  F (36.6  C) (Temporal)  Ht 5' 3.39\" (1.61 m)  Wt 162 lb (73.5 kg)  BMI 28.35 kg/m2  BMI= Body mass index is 28.35 kg/(m^2).    General appearance: Patient is alert and fully cooperative with history & exam.  No sign of distress is noted during the visit.     Psychiatric: Affect is pleasant & appropriate.  Patient appears motivated to improve health.     Respiratory: Breathing is regular & unlabored while sitting.     HEENT: Hearing is intact to spoken word.  Speech is clear.  No gross evidence of visual impairment that would impact ambulation.     Vascular: DP & PT 2/4 & regular bilaterally.  No significant edema, rubor or varicosities noted.  CFT and skin temperature is normal to both lower extremities.       Neurologic: Lower extremity sensation is intact " [facility]  INTERNAL MEDICINE TCM VISIT     PATIENT INFORMATION     Kiah Rivera   69 y o  female   MRN: [id number]    ASSESSMENT/PLAN     Diagnoses and all orders for this visit:    Carpal tunnel syndrome of right wrist  3 month hx of worsening R hand paresthesias  Tinel sign+  · Supportive care w/ wrist splint  Script sent and exercises reviewed  · If sx persist consider physical therapy referral vs orthopedic/hand surgery for injections  -     Cock Up Wrist Splint    IUD (intrauterine device) in place  Imaging revealed IUD in place in mid lower pelvis  Pt reports this has been in for 50+ yrs, was told at that time did not need removal    · Informed pt risk and complications of long-term IUD placement  · Will refer to ob/gyn  -     Ambulatory referral to Obstetrics / Gynecology; Future    S/P reverse total shoulder arthroplasty, left  Completed on 06/01/2021  S/p rehab  Had follow-up with orthopedic surgery outpatient on 06/14  · Doing well postop  · Next follow up with orthopedics in 2 months    [facility]     Reason for recent hospitalization:     TCM Call (since 5/22/2021)     Hospital care reviewed  Records reviewed    Patient was hospitialized at  [facility]        Date of Admission  06/01/21    Date of discharge  06/03/21    Diagnosis  S/P REVERSAL TOTAL SHOULDER ARTHROPLASTY - [id number]    Disposition  Rehabilitation center (Comment)  [place] Call (since 5/22/2021)     I have advised the patient to call PCP with any new or worsening symptoms  Ángel Keith LPN          HPI and hospital summary per Dr Lola Grant " This is a 69y o  year old female that presented to the office with signs and symptoms of left shoulder osteoarthritis and/or other pathology  They tried and failed conservative treatment measures and wished to proceed with surgical intervention   The risks, benefits, and complications of the procedure were discussed with the patient and informed consent was obtained  The patient was admitted to the hospital on 6/1/2021 and underwent an uncomplicated left reverse total shoulder arthroplasty  They were transferred to the floor after a brief stay in the post-anesthesia care unit  Their pain was well managed with IV and oral pain medications  On discharge date pt was cleared by PT and the medicine team and determined to be safe for discharge to go to rehab"  Pt states she did well at [facility] for rehab  Was discharged on 6/17  Only compliant at this time is 3 month hx of worsening R hand pain, paresthesia, worse at night and in the morning  Denies n/v/d/c/fevers or chills, chest pain, or palpitations  All other ROS negative at this time  REVIEW OF SYSTEMS     Review of Systems   Constitutional: Positive for activity change  Negative for appetite change, fatigue and fever  HENT: Negative for congestion and dental problem  Eyes: Negative for itching  Respiratory: Negative for cough and chest tightness  Neurological: Positive for weakness and numbness  Negative for dizziness  All other systems reviewed and are negative  OBJECTIVE     Vitals:    06/22/21 1036   BP: 122/71   BP Location: Right arm   Patient Position: Sitting   Cuff Size: Standard   Pulse: 76   Temp: 97 9 °F (36 6 °C)   TempSrc: Temporal     Physical Exam  Vitals reviewed  Constitutional:       General: She is not in acute distress  Appearance: She is well-developed  She is not diaphoretic  Comments: In wheelchair   HENT:      Head: Normocephalic and atraumatic  Nose: Nose normal       Mouth/Throat:      Pharynx: No oropharyngeal exudate  Eyes:      General: No scleral icterus  Right eye: No discharge  Left eye: No discharge  Conjunctiva/sclera: Conjunctivae normal    Cardiovascular:      Rate and Rhythm: Normal rate and regular rhythm  Heart sounds: Normal heart sounds  No murmur heard  to light touch.  No evidence of weakness in the lower extremities.  No evidence of neuropathy and negative tinel sign.     Dermatologic: Skin is intact to both lower extremities without significant lesions, rash or abrasion.  Normal texture turgor and tone. No paronychia or evidence of soft tissue infection is noted.    Musculoskeletal: Patient is ambulatory without assistive device or brace.  Much reduced discomfort is noted with firm palpation along the lateral band of the plantar fascia right foot most notably at the origination upon the calcaneus not through the arch.  Also discomfort about the insertion of the Achilles tendon superior lateral and very subtle discomfort along the peroneal tendons on the right foot only.  No edema through any of the structures.  No pain or limitation throughout range of motion no crepitus throughout range of motion of the subtalar joint.  Subtle prominence about the peroneal tendons about the distal fibula however this is equal bilateral.    There is a palpable bone prominence on right greater than left superior lateral aspect of the calcaneus and discomfort at the insertion of the superior lateral Achilles right foot only.    Hypermobile midfoot and prominent high instep.    Radiographs:  Nonweightbearing calcaneal axial and lateral demonstrate no obvious osteophyte or loose bodies. No acute cortical reaction.     ASSESSMENT:       ICD-10-CM    1. Plantar fascial fibromatosis M72.2 diclofenac (VOLTAREN) 75 MG EC tablet   2. Achilles tendinitis of right lower extremity M76.61 diclofenac (VOLTAREN) 75 MG EC tablet       PLAN:  Reviewed patient's [facility] in [facility] and discussed etiology and treatment options.      Treatments:  11/13/2017  Discontinue barefoot walking or unsupported walking in shoes without shank.  Dispensed written instructions to obtain appropriate shoe gear and/or OTC inserts.  Dispensed anterior night splint to use all night every night.  Prescription oral Voltaren  No friction rub  No gallop  Pulmonary:      Effort: Pulmonary effort is normal  No respiratory distress  Breath sounds: Normal breath sounds  No wheezing or rales  Abdominal:      General: Bowel sounds are normal  There is no distension  Palpations: Abdomen is soft  Tenderness: There is no abdominal tenderness  There is no guarding  Musculoskeletal:      Cervical back: Normal range of motion and neck supple  Comments: Left arm in shoulder sling  Tinel +R wrist and hand  generalized weakness in b/l LE  Skin:     General: Skin is warm and dry  Findings: No erythema  Neurological:      Mental Status: She is alert and oriented to person, place, and time  Psychiatric:         Behavior: Behavior normal        CURRENT MEDICATIONS     Current Outpatient Medications:     docusate sodium (COLACE) 100 mg capsule, Take 1 capsule (100 mg total) by mouth 2 (two) times a day, Disp: 10 capsule, Rfl: 0    famotidine (PEPCID) 20 mg tablet, Take 1 tablet (20 mg total) by mouth daily, Disp: 30 tablet, Rfl: 0    gabapentin (NEURONTIN) 300 mg capsule, TAKE ONE CAPSULE BY MOUTH THREE TIMES A DAY, Disp: 270 capsule, Rfl: 1    hydroxychloroquine (PLAQUENIL) 200 mg tablet, 1 pill am 1/2 pill pm, Disp: , Rfl:     levothyroxine 25 mcg tablet, TAKE 1 TABLET BY MOUTH EVERY DAY, Disp: 90 tablet, Rfl: 1    methotrexate 2 5 MG tablet, 3 tablets by mouth once a week on Thursday, Disp:  , Rfl:     Multiple Vitamins-Minerals (CENTRUM SILVER PO), Take 1 tablet by mouth daily, Disp: , Rfl:     nortriptyline (PAMELOR) 50 mg capsule, TAKE 1 CAPSULE BY MOUTH AT BEDTIME, Disp: 90 capsule, Rfl: 0    oxyCODONE (ROXICODONE) 5 mg immediate release tablet, 1 tablets every 6 hours as needed for severe shoulder pain only  , Disp: 13 tablet, Rfl: 0    predniSONE 5 mg tablet, Take 5 mg by mouth daily, Disp: , Rfl:     Psyllium (METAMUCIL FIBER PO), Take 1 packet by mouth 3 (three) times a day  , Disp: , Rfl:    for a short course. Discussed risks.  Prescription for custom molded orthotics 11/13/2017 to dress hypermobile midfoot and high instep  Follow up in 4-5 weeks     Achilles tendinitis and plantar fasciitis right foot    12/18/2017  Is using NS, voltaren, been molded for orthotics  Pain is 2/10.   Stay in shoes, voltaren, night splint and get the orthotics asap    If not getting better then will order MRI right ankle.     Shawn Simon DPM   Vitamin E 400 units TABS, Take 400 Units by mouth daily  , Disp: , Rfl:     HISTORICAL INFORMATION     Past Medical History:   Diagnosis Date    Acute blood loss anemia 9/9/2020    Aftercare following joint replacement 9/9/2020    Patient had right reversed total shoulder arthroplasty   Pain was controlled when he left the hospital       Anxiety     Arthritis     Depression     Disease of thyroid gland     HYPO    Femur neck fracture (HCC)     GERD (gastroesophageal reflux disease)     Hyperthyroidism     RESOLVED: [id number]    Osteoporosis     Polio     PVD (peripheral vascular disease) ([facility] [address] )     Right BBB/left ant fasc block     UTI (urinary tract infection)     Varicella infection     LAST ASSESSED: [id number]     Past Surgical History:   Procedure Laterality Date    APPENDECTOMY      HIP ARTHROPLASTY Left 11/27/2017    Procedure: REMOVAL IM NAIL CONVERSION TO TOTAL HIP ARTHROPLASTY POSTERIOR APPROACH;  Surgeon: Lily Vega MD;  [facility];  Service: Orthopedics    HIP FRACTURE SURGERY      HIP SURGERY      both hips replaced;2013 left ,2014 right    JOINT REPLACEMENT Right     HIP TOTAL    [place] CONV PREV HIP SURG TO TOT HIP Sara Ripa Left 10/4/2017    Procedure: Eveline Parrish removal of left hip;  Surgeon: Lily Vega MD;  [facility];  Service: Orthopedics    [place] RECONSTR TOTAL SHOULDER IMPLANT Right 9/2/2020    Procedure: ARTHROPLASTY SHOULDER REVERSE;  Surgeon: Dieudonne Ovalle MD;  [facility];  Service: Orthopedics    [place] RECONSTR TOTAL SHOULDER IMPLANT Left 6/1/2021    Procedure: ARTHROPLASTY SHOULDER REVERSE;  Surgeon: Dieudonne Ovalle MD;  [facility];  Service: Orthopedics    REVISION TOTAL HIP ARTHROPLASTY Right     TONSILLECTOMY       Social History     Socioeconomic History    Marital status:      Spouse name: Not on file    Number of children: 1    Years of education: Not on file    Highest education level: Not on file Occupational History    Occupation: RETIRED    Tobacco Use    Smoking status: Former Smoker    Smokeless tobacco: Never Used    Tobacco comment: quit 20-30 years ago   Vaping Use    Vaping Use: Never used   Substance and Sexual Activity    Alcohol use: Not Currently    Drug use: Not Currently    Sexual activity: Not Currently   Other Topics Concern    Not on file   Social History Narrative    Always uses seat belt    Caffeine use    Druze    Single as per all scripts      Social Determinants of Health     Financial Resource Strain: Low Risk     Difficulty of Paying Living Expenses: Not hard at all   Food Insecurity: No Food Insecurity    Worried About [facility] in the Last Year: Never true    [facility] in the Last Year: Never true   Transportation Needs: No Transportation Needs    Lack of Transportation (Medical): No    Lack of Transportation (Non-Medical): No   Physical Activity: Inactive    Days of Exercise per Week: 0 days    Minutes of Exercise per Session: 0 min   Stress: No Stress Concern Present    Feeling of Stress : Not at all   Social Connections: Socially Isolated    Frequency of Communication with Friends and Family: Three times a week    Frequency of Social Gatherings with Friends and Family: Twice a week    Attends Scientology Services: Never    Active Member of Clubs or Organizations: No    Attends Club or Organization Meetings: Never    Marital Status:    Intimate Partner Violence: Not At Risk    Fear of Current or Ex-Partner: No    Emotionally Abused: No    Physically Abused: No    Sexually Abused: No     Family History   Problem Relation Age of Onset    Rheum arthritis Mother     Rheum arthritis Sister     Prostate cancer Brother      ==  Jailene Castellano DO  Internal Medicine- PGY2  [address] , [address] [address] [facility], [facility]  Office: [phone]  Fax: [phone]

## 2021-08-17 ENCOUNTER — LAB REQUISITION (OUTPATIENT)
Dept: LAB | Facility: CLINIC | Age: 46
End: 2021-08-17

## 2021-08-17 PROCEDURE — U0005 INFEC AGEN DETEC AMPLI PROBE: HCPCS | Performed by: FAMILY MEDICINE

## 2021-08-18 LAB — SARS-COV-2 RNA RESP QL NAA+PROBE: NEGATIVE

## 2021-08-24 ENCOUNTER — LAB REQUISITION (OUTPATIENT)
Dept: LAB | Facility: CLINIC | Age: 46
End: 2021-08-24

## 2021-08-24 PROCEDURE — U0005 INFEC AGEN DETEC AMPLI PROBE: HCPCS | Performed by: FAMILY MEDICINE

## 2021-08-25 LAB — SARS-COV-2 RNA RESP QL NAA+PROBE: NEGATIVE

## 2021-08-31 ENCOUNTER — LAB REQUISITION (OUTPATIENT)
Dept: LAB | Facility: CLINIC | Age: 46
End: 2021-08-31

## 2021-08-31 PROCEDURE — U0003 INFECTIOUS AGENT DETECTION BY NUCLEIC ACID (DNA OR RNA); SEVERE ACUTE RESPIRATORY SYNDROME CORONAVIRUS 2 (SARS-COV-2) (CORONAVIRUS DISEASE [COVID-19]), AMPLIFIED PROBE TECHNIQUE, MAKING USE OF HIGH THROUGHPUT TECHNOLOGIES AS DESCRIBED BY CMS-2020-01-R: HCPCS | Performed by: FAMILY MEDICINE

## 2021-09-01 LAB — SARS-COV-2 RNA RESP QL NAA+PROBE: NEGATIVE

## 2021-09-19 ENCOUNTER — HEALTH MAINTENANCE LETTER (OUTPATIENT)
Age: 46
End: 2021-09-19

## 2022-01-09 ENCOUNTER — HEALTH MAINTENANCE LETTER (OUTPATIENT)
Age: 47
End: 2022-01-09

## 2022-02-24 NOTE — NURSING NOTE
Dispensed 1 Dorsal (Anterior) Night Splint, Size S/M, with [facility] agreement signed by patient. Grace Salmeron CMA, November 13, 2017     harinder

## 2022-03-23 ENCOUNTER — HOSPITAL ENCOUNTER (EMERGENCY)
Facility: CLINIC | Age: 47
Discharge: HOME | End: 2022-03-23
Attending: EMERGENCY MEDICINE | Admitting: EMERGENCY MEDICINE
Payer: COMMERCIAL

## 2022-03-23 ENCOUNTER — APPOINTMENT (OUTPATIENT)
Dept: CT IMAGING | Facility: CLINIC | Age: 47
End: 2022-03-23
Attending: EMERGENCY MEDICINE
Payer: COMMERCIAL

## 2022-03-23 VITALS
RESPIRATION RATE: 16 BRPM | SYSTOLIC BLOOD PRESSURE: 112 MMHG | OXYGEN SATURATION: 99 % | WEIGHT: 161.9 LBS | HEART RATE: 76 BPM | BODY MASS INDEX: 28.37 KG/M2 | DIASTOLIC BLOOD PRESSURE: 72 MMHG | TEMPERATURE: 97.8 F

## 2022-03-23 DIAGNOSIS — R10.32 ABDOMINAL PAIN, LEFT LOWER QUADRANT: ICD-10-CM

## 2022-03-23 DIAGNOSIS — K59.00 CONSTIPATION, UNSPECIFIED CONSTIPATION TYPE: ICD-10-CM

## 2022-03-23 LAB
ALBUMIN SERPL-MCNC: 3.8 G/DL (ref 3.4–5)
ALBUMIN UR-MCNC: NEGATIVE MG/DL
ALP SERPL-CCNC: 75 U/L (ref 40–150)
ALT SERPL W P-5'-P-CCNC: 21 U/L (ref 0–50)
ANION GAP SERPL CALCULATED.3IONS-SCNC: 4 MMOL/L (ref 3–14)
APPEARANCE UR: CLEAR
AST SERPL W P-5'-P-CCNC: 10 U/L (ref 0–45)
BACTERIA #/AREA URNS HPF: ABNORMAL /HPF
BASOPHILS # BLD AUTO: 0 10E3/UL (ref 0–0.2)
BASOPHILS NFR BLD AUTO: 0 %
BILIRUB SERPL-MCNC: 0.3 MG/DL (ref 0.2–1.3)
BILIRUB UR QL STRIP: NEGATIVE
BUN SERPL-MCNC: 12 MG/DL (ref 7–30)
CALCIUM SERPL-MCNC: 8.4 MG/DL (ref 8.5–10.1)
CHLORIDE BLD-SCNC: 111 MMOL/L (ref 94–109)
CO2 SERPL-SCNC: 23 MMOL/L (ref 20–32)
COLOR UR AUTO: YELLOW
CREAT SERPL-MCNC: 0.82 MG/DL (ref 0.52–1.04)
EOSINOPHIL # BLD AUTO: 0.2 10E3/UL (ref 0–0.7)
EOSINOPHIL NFR BLD AUTO: 4 %
ERYTHROCYTE [DISTWIDTH] IN BLOOD BY AUTOMATED COUNT: 12.1 % (ref 10–15)
GFR SERPL CREATININE-BSD FRML MDRD: 89 ML/MIN/1.73M2
GLUCOSE BLD-MCNC: 85 MG/DL (ref 70–99)
GLUCOSE UR STRIP-MCNC: NEGATIVE MG/DL
HCG UR QL: NEGATIVE
HCT VFR BLD AUTO: 37.8 % (ref 35–47)
HGB BLD-MCNC: 12.7 G/DL (ref 11.7–15.7)
HGB UR QL STRIP: ABNORMAL
IMM GRANULOCYTES # BLD: 0 10E3/UL
IMM GRANULOCYTES NFR BLD: 0 %
KETONES UR STRIP-MCNC: NEGATIVE MG/DL
LACTATE SERPL-SCNC: 0.7 MMOL/L (ref 0.7–2)
LEUKOCYTE ESTERASE UR QL STRIP: ABNORMAL
LYMPHOCYTES # BLD AUTO: 1.2 10E3/UL (ref 0.8–5.3)
LYMPHOCYTES NFR BLD AUTO: 23 %
MCH RBC QN AUTO: 31.5 PG (ref 26.5–33)
MCHC RBC AUTO-ENTMCNC: 33.6 G/DL (ref 31.5–36.5)
MCV RBC AUTO: 94 FL (ref 78–100)
MONOCYTES # BLD AUTO: 0.3 10E3/UL (ref 0–1.3)
MONOCYTES NFR BLD AUTO: 6 %
MUCOUS THREADS #/AREA URNS LPF: PRESENT /LPF
NEUTROPHILS # BLD AUTO: 3.4 10E3/UL (ref 1.6–8.3)
NEUTROPHILS NFR BLD AUTO: 67 %
NITRATE UR QL: NEGATIVE
NRBC # BLD AUTO: 0 10E3/UL
NRBC BLD AUTO-RTO: 0 /100
PH UR STRIP: 7 [PH] (ref 5–7)
PLATELET # BLD AUTO: 228 10E3/UL (ref 150–450)
POTASSIUM BLD-SCNC: 3.7 MMOL/L (ref 3.4–5.3)
PROT SERPL-MCNC: 7.2 G/DL (ref 6.8–8.8)
RBC # BLD AUTO: 4.03 10E6/UL (ref 3.8–5.2)
RBC URINE: 150 /HPF
SODIUM SERPL-SCNC: 138 MMOL/L (ref 133–144)
SP GR UR STRIP: 1.02 (ref 1–1.03)
SQUAMOUS EPITHELIAL: 8 /HPF
UROBILINOGEN UR STRIP-MCNC: NORMAL MG/DL
WBC # BLD AUTO: 5.1 10E3/UL (ref 4–11)
WBC URINE: 17 /HPF

## 2022-03-23 PROCEDURE — 258N000003 HC RX IP 258 OP 636: Performed by: EMERGENCY MEDICINE

## 2022-03-23 PROCEDURE — 250N000013 HC RX MED GY IP 250 OP 250 PS 637: Performed by: EMERGENCY MEDICINE

## 2022-03-23 PROCEDURE — 250N000011 HC RX IP 250 OP 636: Performed by: EMERGENCY MEDICINE

## 2022-03-23 PROCEDURE — 81025 URINE PREGNANCY TEST: CPT | Performed by: EMERGENCY MEDICINE

## 2022-03-23 PROCEDURE — 80053 COMPREHEN METABOLIC PANEL: CPT | Performed by: EMERGENCY MEDICINE

## 2022-03-23 PROCEDURE — 96375 TX/PRO/DX INJ NEW DRUG ADDON: CPT | Performed by: EMERGENCY MEDICINE

## 2022-03-23 PROCEDURE — 87086 URINE CULTURE/COLONY COUNT: CPT | Performed by: EMERGENCY MEDICINE

## 2022-03-23 PROCEDURE — 74176 CT ABD & PELVIS W/O CONTRAST: CPT

## 2022-03-23 PROCEDURE — 83605 ASSAY OF LACTIC ACID: CPT | Performed by: EMERGENCY MEDICINE

## 2022-03-23 PROCEDURE — 99285 EMERGENCY DEPT VISIT HI MDM: CPT | Mod: 25 | Performed by: EMERGENCY MEDICINE

## 2022-03-23 PROCEDURE — 85004 AUTOMATED DIFF WBC COUNT: CPT | Performed by: EMERGENCY MEDICINE

## 2022-03-23 PROCEDURE — 96376 TX/PRO/DX INJ SAME DRUG ADON: CPT | Performed by: EMERGENCY MEDICINE

## 2022-03-23 PROCEDURE — 81001 URINALYSIS AUTO W/SCOPE: CPT | Performed by: EMERGENCY MEDICINE

## 2022-03-23 PROCEDURE — 36415 COLL VENOUS BLD VENIPUNCTURE: CPT | Performed by: EMERGENCY MEDICINE

## 2022-03-23 PROCEDURE — 96374 THER/PROPH/DIAG INJ IV PUSH: CPT | Performed by: EMERGENCY MEDICINE

## 2022-03-23 PROCEDURE — 96361 HYDRATE IV INFUSION ADD-ON: CPT | Performed by: EMERGENCY MEDICINE

## 2022-03-23 PROCEDURE — 99285 EMERGENCY DEPT VISIT HI MDM: CPT | Performed by: EMERGENCY MEDICINE

## 2022-03-23 RX ORDER — ONDANSETRON 2 MG/ML
4 INJECTION INTRAMUSCULAR; INTRAVENOUS ONCE
Status: COMPLETED | OUTPATIENT
Start: 2022-03-23 | End: 2022-03-23

## 2022-03-23 RX ORDER — OXYCODONE AND ACETAMINOPHEN 5; 325 MG/1; MG/1
1 TABLET ORAL EVERY 6 HOURS PRN
Qty: 12 TABLET | Refills: 0 | Status: SHIPPED | OUTPATIENT
Start: 2022-03-23 | End: 2022-03-26

## 2022-03-23 RX ORDER — PROPRANOLOL HYDROCHLORIDE 20 MG/1
20 TABLET ORAL 2 TIMES DAILY PRN
COMMUNITY
Start: 2020-11-23

## 2022-03-23 RX ORDER — SODIUM CHLORIDE 9 MG/ML
INJECTION, SOLUTION INTRAVENOUS CONTINUOUS
Status: DISCONTINUED | OUTPATIENT
Start: 2022-03-23 | End: 2022-03-23 | Stop reason: HOSPADM

## 2022-03-23 RX ORDER — KETOROLAC TROMETHAMINE 15 MG/ML
15 INJECTION, SOLUTION INTRAMUSCULAR; INTRAVENOUS ONCE
Status: COMPLETED | OUTPATIENT
Start: 2022-03-23 | End: 2022-03-23

## 2022-03-23 RX ORDER — BACLOFEN 10 MG/1
10 TABLET ORAL 3 TIMES DAILY PRN
COMMUNITY
Start: 2022-01-28

## 2022-03-23 RX ORDER — MAGNESIUM CARB/ALUMINUM HYDROX 105-160MG
296 TABLET,CHEWABLE ORAL ONCE
Status: COMPLETED | OUTPATIENT
Start: 2022-03-23 | End: 2022-03-23

## 2022-03-23 RX ORDER — HYDROMORPHONE HYDROCHLORIDE 1 MG/ML
0.5 INJECTION, SOLUTION INTRAMUSCULAR; INTRAVENOUS; SUBCUTANEOUS
Status: DISCONTINUED | OUTPATIENT
Start: 2022-03-23 | End: 2022-03-23 | Stop reason: HOSPADM

## 2022-03-23 RX ADMIN — ONDANSETRON 4 MG: 2 INJECTION INTRAMUSCULAR; INTRAVENOUS at 16:24

## 2022-03-23 RX ADMIN — MAGNESIUM CITRATE 296 ML: 1.75 LIQUID ORAL at 17:12

## 2022-03-23 RX ADMIN — HYDROMORPHONE HYDROCHLORIDE 0.5 MG: 1 INJECTION, SOLUTION INTRAMUSCULAR; INTRAVENOUS; SUBCUTANEOUS at 16:28

## 2022-03-23 RX ADMIN — SODIUM PHOSPHATE 1 ENEMA: 7; 19 ENEMA RECTAL at 15:37

## 2022-03-23 RX ADMIN — HYDROMORPHONE HYDROCHLORIDE 0.5 MG: 1 INJECTION, SOLUTION INTRAMUSCULAR; INTRAVENOUS; SUBCUTANEOUS at 14:17

## 2022-03-23 RX ADMIN — KETOROLAC TROMETHAMINE 15 MG: 15 INJECTION, SOLUTION INTRAMUSCULAR; INTRAVENOUS at 13:30

## 2022-03-23 RX ADMIN — SODIUM CHLORIDE 1000 ML: 9 INJECTION, SOLUTION INTRAVENOUS at 13:19

## 2022-03-23 NOTE — ED TRIAGE NOTES
Pt presents with LLQ pain that started 1030. Pt states urinary pressure also. History of kidney stones.

## 2022-03-23 NOTE — ED PROVIDER NOTES
History     Chief Complaint   Patient presents with     Abdominal Pain     LLQ. Urinary pressure. Pt with history of kidney stones.      HPI  Deo Hargrove is a 46 year old female who presents with sudden onset of left lower quadrant abdominal pain that began around 11:00 this morning.  Patient admitts that over the last couple days she has had a couple of twinges on that side but never this severe.  No diarrhea.  Patient thought it may be her IBS so she took one of her pain pills that was prescribed without improvement.  She states she has alternating constipation and diarrhea.  She is not noticed any bloody or dark stools.  Denies fever or chills.  No upper respiratory congestion or sore throat.  No cough, chest pain or shortness of breath.  Previous history of renal stones bilaterally.  She states now that she is having urinary frequency.  She is unsure if this is IBS or a kidney stone.  No exposure to infectious GI illness.  No recent travel.  No injury or fall.  No antibiotic use.    Allergies:  Allergies   Allergen Reactions     Penicillins      Triptans [Sumatriptan]      Tightness of the throat       Problem List:    Patient Active Problem List    Diagnosis Date Noted     Insomnia 2014     Priority: Medium     Generalized anxiety disorder 12/10/2012     Priority: Medium     Diagnosis updated by automated process. Provider to review and confirm.       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     Priority: Medium     Migraine headache 2012     Priority: Medium        Past Medical History:    Past Medical History:   Diagnosis Date     Anxiety      Headaches, migraine      Kidney stone        Past Surgical History:    Past Surgical History:   Procedure Laterality Date      SECTION       GYN SURGERY      had LT tube removed       Family History:    Family History   Problem Relation Age of Onset     Hypertension Mother      Lipids Mother      Alcohol/Drug Father      Breast Cancer  Maternal Grandmother      Breast Cancer Paternal Grandmother        Social History:  Marital Status:   [2]  Social History     Tobacco Use     Smoking status: Never Smoker     Smokeless tobacco: Never Used   Substance Use Topics     Alcohol use: Yes     Comment: occasional     Drug use: No        Medications:    baclofen (LIORESAL) 10 MG tablet  carBAMazepine (CARBATROL) 200 MG 12 hr capsule  hyoscyamine SL (LEVSIN/SL) 0.125 MG sublingual tablet  magnesium oxide (MAG-OX) 400 (241.3 Mg) MG tablet  oxyCODONE-acetaminophen (PERCOCET) 5-325 MG tablet  propranolol (INDERAL) 20 MG tablet  topiramate (TOPAMAX) 100 MG tablet  traMADol (ULTRAM) 50 MG tablet  busPIRone (BUSPAR) 10 MG tablet  busPIRone HCl (BUSPAR) 30 MG tablet  hydrOXYzine (ATARAX) 25 MG tablet  LORazepam (ATIVAN) 0.5 MG tablet  LORazepam (ATIVAN) 0.5 MG tablet  traZODone (DESYREL) 50 MG tablet          Review of Systems all other systems are reviewed and are negative.    Physical Exam   BP: 110/72  Pulse: 68  Temp: 97.8  F (36.6  C)  Resp: 16  Weight: 73.4 kg (161 lb 14.4 oz)  SpO2: 100 %      Physical Exam neuro alert cooperative female moderate distress.  HEENT shows no scleral icterus.  No proptosis.  Neck is supple with no thyromegaly or bruits.  Lungs were clear without adventitious sounds.  Left CVA tenderness to percussion.  Abdomen reveals active bowel sounds she does have mild to moderate right lower quadrant tenderness without guarding or rebound.  No organomegaly or masses.  No skin rash over the flank or abdomen.    ED Course                 Procedures              Critical Care time:  none               Results for orders placed or performed during the hospital encounter of 03/23/22 (from the past 24 hour(s))   CBC with platelets differential    Narrative    The following orders were created for panel order CBC with platelets differential.  Procedure                               Abnormality         Status                     ---------                                -----------         ------                     CBC with platelets and d...[[id number]]                      Final result                 Please view results for these tests on the individual orders.   Comprehensive metabolic panel   Result Value Ref Range    Sodium 138 133 - 144 mmol/L    Potassium 3.7 3.4 - 5.3 mmol/L    Chloride 111 (H) 94 - 109 mmol/L    Carbon Dioxide (CO2) 23 20 - 32 mmol/L    Anion Gap 4 3 - 14 mmol/L    Urea Nitrogen 12 7 - 30 mg/dL    Creatinine 0.82 0.52 - 1.04 mg/dL    Calcium 8.4 (L) 8.5 - 10.1 mg/dL    Glucose 85 70 - 99 mg/dL    Alkaline Phosphatase 75 40 - 150 U/L    AST 10 0 - 45 U/L    ALT 21 0 - 50 U/L    Protein Total 7.2 6.8 - 8.8 g/dL    Albumin 3.8 3.4 - 5.0 g/dL    Bilirubin Total 0.3 0.2 - 1.3 mg/dL    GFR Estimate 89 >60 mL/min/1.73m2   Lactic acid whole blood   Result Value Ref Range    Lactic Acid 0.7 0.7 - 2.0 mmol/L   CBC with platelets and differential   Result Value Ref Range    WBC Count 5.1 4.0 - 11.0 10e3/uL    RBC Count 4.03 3.80 - 5.20 10e6/uL    Hemoglobin 12.7 11.7 - 15.7 g/dL    Hematocrit 37.8 35.0 - 47.0 %    MCV 94 78 - 100 fL    MCH 31.5 26.5 - 33.0 pg    MCHC 33.6 31.5 - 36.5 g/dL    RDW 12.1 10.0 - 15.0 %    Platelet Count 228 150 - 450 10e3/uL    % Neutrophils 67 %    % Lymphocytes 23 %    % Monocytes 6 %    % Eosinophils 4 %    % Basophils 0 %    % Immature Granulocytes 0 %    NRBCs per 100 WBC 0 <1 /100    Absolute Neutrophils 3.4 1.6 - 8.3 10e3/uL    Absolute Lymphocytes 1.2 0.8 - 5.3 10e3/uL    Absolute Monocytes 0.3 0.0 - 1.3 10e3/uL    Absolute Eosinophils 0.2 0.0 - 0.7 10e3/uL    Absolute Basophils 0.0 0.0 - 0.2 10e3/uL    Absolute Immature Granulocytes 0.0 <=0.4 10e3/uL    Absolute NRBCs 0.0 10e3/uL   UA with Microscopic reflex to Culture    Specimen: Urine, Midstream   Result Value Ref Range    Color Urine Yellow Colorless, Straw, Light Yellow, Yellow    Appearance Urine Clear Clear    Glucose Urine Negative Negative  mg/dL    Bilirubin Urine Negative Negative    Ketones Urine Negative Negative mg/dL    Specific Gravity Urine 1.019 1.003 - 1.035    Blood Urine Moderate (A) Negative    pH Urine 7.0 5.0 - 7.0    Protein Albumin Urine Negative Negative mg/dL    Urobilinogen Urine Normal Normal, 2.0 mg/dL    Nitrite Urine Negative Negative    Leukocyte Esterase Urine Small (A) Negative    Bacteria Urine Few (A) None Seen /HPF    Mucus Urine Present (A) None Seen /LPF    RBC Urine 150 (H) <=2 /HPF    WBC Urine 17 (H) <=5 /HPF    Squamous Epithelials Urine 8 (H) <=1 /HPF    Narrative    Urine Culture ordered based on laboratory criteria   HCG qualitative urine (UPT)   Result Value Ref Range    hCG Urine Qualitative Negative Negative   CT Abdomen Pelvis w/o Contrast    Narrative    CT ABDOMEN AND PELVIS WITHOUT CONTRAST  3/23/2022 2:43 PM    CLINICAL HISTORY: Flank pain, kidney stone suspected. History of  stones, renal stents, ectopic pregnancy, and .    TECHNIQUE: CT scan of the abdomen and pelvis was performed without IV  contrast. Multiplanar reformats were obtained. Dose reduction  techniques were used.    CONTRAST: None.    COMPARISON: CT abdomen and pelvis dated 2015.    FINDINGS:   LOWER CHEST: Normal.    HEPATOBILIARY: Small hypoattenuating (fatty) liver lesion in the dome  of the right lobe of liver segment VIII (image 13 series 3) is  unchanged since the prior CT. The liver is otherwise normal in  appearance. Gallbladder is normal in appearance. No cholelithiasis,  choledocholithiasis or biliary ductal dilatation.    PANCREAS: Normal.    SPLEEN: Normal.    ADRENAL GLANDS: Normal.    KIDNEYS/BLADDER: Multiple nonobstructive bilateral intrarenal calculi  are again noted. The largest is in the right kidney and measures  cross-sectional dimension of _______ 0.8/0.9 cm. This is significantly  increased in size since the prior CT from  where the largest  lesion in the same location measured up to 0.4 cm in  maximum  dimension. There are at least three other nonobstructive right  intrarenal calculi measuring less than 0.4 cm. There are at least  seven nonobstructive left intrarenal calculi with the largest  measuring up to 0.3 cm. No hydronephrosis, hydroureter, or ureteral  calculus is identified. Urinary bladder is normal in appearance.    BOWEL: Oral contrast agent is seen in the distal colon indicating  patency of the bowel. The colon is grossly of normal caliber and  appearance. Appendix is normal in appearance. Small bowel is of normal  caliber and appearance. The stomach is mostly decompressed but does  contain a small amount of ingested material and air. Stomach is  otherwise unremarkable.    LYMPH NODES: Normal.    VASCULATURE: Unremarkable.    PELVIC ORGANS: Intrauterine device is seen in expected position within  the uterus. Uterus and ovaries are otherwise unremarkable.    OTHER: No abnormal free fluid or free air collections are seen in the  peritoneal cavity.    MUSCULOSKELETAL: Normal.      Impression    IMPRESSION:   1.  Multiple nonobstructive bilateral renal calculi have increased in  size and number since the prior CT dated 2015.  2.  No evidence for obstructive urinary system process or ureteral  calculus is seen.  3.  No evidence for diverticulitis, bowel obstruction or other  etiology for patient's symptoms is identified.  4.  Evaluation of the solid organs of the abdomen and pelvis is  limited due to lack of IV contrast.       Medications   0.9% sodium chloride BOLUS (0 mLs Intravenous Stopped 3/23/22 1442)     Followed by   sodium chloride 0.9% infusion (has no administration in time range)   HYDROmorphone (PF) (DILAUDID) injection 0.5 mg (0.5 mg Intravenous Given 3/23/22 1628)   ketorolac (TORADOL) injection 15 mg (15 mg Intravenous Given 3/23/22 1330)   sodium phosphate (FLEET ENEMA) 1 enema (1 enema Rectal Given 3/23/22 1537)   ondansetron (ZOFRAN) injection 4 mg (4 mg Intravenous Given  3/23/22 4996)     IV is ordered.  Fluids, Toradol and Dilaudid.  Blood work and urine ordered.  Assessments & Plan (with Medical Decision Making)   Deo Hargrove is a 46 year old female who presents with sudden onset of left lower quadrant abdominal pain that began around 11:00 this morning.  Patient admitts that over the last couple days she has had a couple of twinges on that side but never this severe.  No diarrhea.  Patient thought it may be her IBS so she took one of her pain pills that was prescribed without improvement.  She states she has alternating constipation and diarrhea.  She is not noticed any bloody or dark stools.  Denies fever or chills.  No upper respiratory congestion or sore throat.  No cough, chest pain or shortness of breath.  Previous history of renal stones bilaterally.  She states now that she is having urinary frequency.  She is unsure if this is IBS or a kidney stone.  No exposure to infectious GI illness.  No recent travel.  No injury or fall.  No antibiotic use.  On presentation patient was afebrile and vitally stable.  Her exam was positive for mild left CVA tenderness and left lower quadrant tenderness.  Blood work was unremarkable as above.  Her urine did show 150 red cells with no significant evidence of infection.  A CT did show multiple intra renal stones but no obstructing ureteral stone.  No hydronephrosis noted.  No other distinct cause for her pain was identified.  Did have stool throughout the colon.  With her IBS and alternating constipation and diarrhea suspect this may be because of her symptoms.  She may have passed a stone but usually pain quickly resolves when that passes.  An enema did not relieve her symptoms.  We will try for above with magnesium citrate.  Information on constipation is provided.  She can take ibuprofen or Percocet for pain with the understanding these are constipating.  She does do MiraLAX regularly to prevent constipation and she will resume  taking this when she cleans out.  Reasons to return to the emergency were discussed.  I have reviewed the nursing notes.    I have reviewed the findings, diagnosis, plan and need for follow up with the patient.       New Prescriptions    OXYCODONE-ACETAMINOPHEN (PERCOCET) 5-325 MG TABLET    Take 1 tablet by mouth every 6 hours as needed for pain       Final diagnoses:   Abdominal pain, left lower quadrant   Constipation, unspecified constipation type       3/23/2022   [facility] EMERGENCY DEPT     Theo Kam MD  03/23/22 3264

## 2022-03-23 NOTE — DISCHARGE INSTRUCTIONS
Your exam today was not worrisome from a surgical standpoint.  Your blood work was normal.  The urine did show some blood in the urine that can go along with a kidney stone but the scan of the abdomen did not show any obstructing stone in the ureter or backup of urine into the kidney.  You do have multiple stones in both kidneys that are not causing problems currently.  You may have passed a stone but usually once the stone passes your pain quickly resolves.  The CT did show increased stool throughout the colon some wondering if you are irritable bowel symptoms are causing your pain.  Unfortunately got no relief from the enema so I like to try oral medicine called magnesium citrate to see if that will evacuate the stool.  If this does help it will cause pretty significant crampy pain as the stool moves through the colon so you can take either ibuprofen or Percocet for pain.  Percocet is constipating but for short-term I do not think it will be a problem.  Continue your MiraLAX to hopefully prevent recurrence.  If you have uncontrolled pain, vomiting, fever, or bloody stools please return to the emergency room.

## 2022-03-25 ENCOUNTER — TELEPHONE (OUTPATIENT)
Dept: EMERGENCY MEDICINE | Facility: CLINIC | Age: 47
End: 2022-03-25
Payer: COMMERCIAL

## 2022-03-25 DIAGNOSIS — R82.71 GBS BACTERIURIA: ICD-10-CM

## 2022-03-25 LAB
BACTERIA UR CULT: ABNORMAL
BACTERIA UR CULT: ABNORMAL

## 2022-03-25 RX ORDER — CEFPODOXIME PROXETIL 200 MG/1
200 TABLET, FILM COATED ORAL 2 TIMES DAILY
Qty: 18 TABLET | Refills: 0 | Status: CANCELLED | OUTPATIENT
Start: 2022-03-25 | End: 2022-04-03

## 2022-03-25 NOTE — TELEPHONE ENCOUNTER
[facility] () Emergency Department Lab result notification [Adult-Female]    [place] ED lab result protocol used  Urine Culture    Reason for call  Notify of lab results, assess symptoms,  review ED providers recommendations/discharge instructions (if necessary) and advise per ED lab result f/u protocol    Lab Result (including Rx patient on, if applicable)  Final urine culture on 3/25/22 shows the presence of bacteria(s): 10,000 to 50,000 colonies/mL Streptococcus agalactiae B  [facility] Emergency Dept discharge antibiotic: None  Recommendations in treatment per [facility] ED lab result Urine Culture protocol.    Information table from Emergency Dept Provider visit on 3/23/22  Symptoms reported at ED visit (Chief complaint, HPI) Chief Complaint   Patient presents with     Abdominal Pain       LLQ. Urinary pressure. Pt with history of kidney stones.       HPI  Deo Hargrove is a 46 year old female who presents with sudden onset of left lower quadrant abdominal pain that began around 11:00 this morning.  Patient admitts that over the last couple days she has had a couple of twinges on that side but never this severe.  No diarrhea.  Patient thought it may be her IBS so she took one of her pain pills that was prescribed without improvement.  She states she has alternating constipation and diarrhea.  She is not noticed any bloody or dark stools.  Denies fever or chills.  No upper respiratory congestion or sore throat.  No cough, chest pain or shortness of breath.  Previous history of renal stones bilaterally.  She states now that she is having urinary frequency.  She is unsure if this is IBS or a kidney stone.  No exposure to infectious GI illness.  No recent travel.  No injury or fall.  No antibiotic use.     Significant Medical hx, if applicable (i.e. CKD, diabetes) Reviewed   Allergies Allergies   Allergen Reactions     Penicillins      Triptans [Sumatriptan]      Tightness of the  throat      Weight, if applicable Wt Readings from Last 2 Encounters:   03/23/22 73.4 kg (161 lb 14.4 oz)   07/09/19 75.5 kg (166 lb 6.4 oz)      Coumadin/Warfarin [Yes /No] NO   Creatinine Level (mg/dl) Creatinine   Date Value Ref Range Status   03/23/2022 0.82 0.52 - 1.04 mg/dL Final   01/21/2019 0.71 0.52 - 1.04 mg/dL Final      Creatinine clearance (ml/min), if applicable Creatinine clearance cannot be calculated (Unknown ideal weight.)   Pregnant (Yes/No/NA) NO by HCG 3/23/22   Breastfeeding (Yes/No/NA) Unknown   ED providers Impression and Plan (applicable information) Deo Hargrove is a 46 year old female who presents with sudden onset of left lower quadrant abdominal pain that began around 11:00 this morning.  Patient admitts that over the last couple days she has had a couple of twinges on that side but never this severe.  No diarrhea.  Patient thought it may be her IBS so she took one of her pain pills that was prescribed without improvement.  She states she has alternating constipation and diarrhea.  She is not noticed any bloody or dark stools.  Denies fever or chills.  No upper respiratory congestion or sore throat.  No cough, chest pain or shortness of breath.  Previous history of renal stones bilaterally.  She states now that she is having urinary frequency.  She is unsure if this is IBS or a kidney stone.  No exposure to infectious GI illness.  No recent travel.  No injury or fall.  No antibiotic use.  On presentation patient was afebrile and vitally stable.  Her exam was positive for mild left CVA tenderness and left lower quadrant tenderness.  Blood work was unremarkable as above.  Her urine did show 150 red cells with no significant evidence of infection.  A CT did show multiple intra renal stones but no obstructing ureteral stone.  No hydronephrosis noted.  No other distinct cause for her pain was identified.  Did have stool throughout the colon.  With her IBS and alternating constipation and  diarrhea suspect this may be because of her symptoms.  She may have passed a stone but usually pain quickly resolves when that passes.  An enema did not relieve her symptoms.  We will try for above with magnesium citrate.  Information on constipation is provided.  She can take ibuprofen or Percocet for pain with the understanding these are constipating.  She does do MiraLAX regularly to prevent constipation and she will resume taking this when she cleans out.  Reasons to return to the emergency were discussed.  I have reviewed the nursing notes.   ED diagnosis  Abdominal pain, left lower quadrant  Constipation, unspecified constipation type   ED provider  Theo Kam MD      RN Assessment (Patient s current Symptoms), include time called.    1:18P - Left voicemail message requesting a call back to [facility] ED Lab Result RN at [phone].  RN is available every day between 9 a.m. and 5:30 p.m.  See Telephone encounter.    Dx in ED with multiple intra renal stones non-obstructing  Fever: afebrile in ED  Genitourinary symptoms: frequency and flank pain in ED  Breastfeeding: Unknown  Penicillin allergy: clarify on severity/anaphylaxis/swelling/breathing problems        Paulie Ray RN  [facility]  Emergency Dept Lab Result RN  Ph# [phone]     Copy of Lab result   Urine Culture  Order: [id number] - Reflex for Order [id number]   Status: Final result     Visible to patient: Yes (not seen)    Specimen Information: Urine, Midstream         2 Result Notes    Culture 10,000-50,000 CFU/mL Streptococcus agalactiae (Group B Streptococcus) Abnormal     This organism is susceptible to ampicillin, penicillin, vancomycin and the cephalosporins. If treatment is required AND your patient is allergic to penicillin, contact the Microbiology Lab within 5 days to request susceptibility testing.   10,000-50,000 CFU/mL Mixture of urogenital felicia            Resulting Agency: [facility]            Specimen Collected: 03/23/22  1:20 PM Last Resulted: 03/25/22 12:33 PM

## 2022-03-27 PROBLEM — R82.71 GBS BACTERIURIA: Status: ACTIVE | Noted: 2022-03-27

## 2022-03-27 NOTE — TELEPHONE ENCOUNTER
"[facility] () Emergency Department Lab result notification [Adult-Female]     [place] ED lab result protocol used  Urine Culture     Reason for call  Notify of lab results, assess symptoms,  review ED providers recommendations/discharge instructions (if necessary) and advise per ED lab result f/u protocol     Lab Result (including Rx patient on, if applicable)  Final urine culture on 3/25/22 shows the presence of bacteria(s): 10,000 to 50,000 colonies/mL Streptococcus agalactiae B  [facility] Emergency Dept discharge antibiotic: None  Recommendations in treatment per [facility] ED lab result Urine Culture protocol.    RN Assessment (Patient s current Symptoms), include time called.  [Insert Left message here if message left]  11:25AM; Spoke with patient. She states she still has her \"normal GI pain\". The urinary symptoms (frequency) are gone. No fever. No blood in the urine. No nausea or vomiting. Is not pregnant or breastfeeding. No flank pain.  Has an appointment to see her GI provider the end of April. Denies any urinary symptoms.     RN Recommendations/Instructions per [place] ED lab result protocol  Patient notified of lab result and treatment recommendations.   RN reviewed information about Group B strep in urine.   Advised that I would consult with the ED provider and call her back.     [place] Emergency Department Provider Name & Recommendations (included time consulted)  11:37AM:  Consulted with [facility] ED provider Dr. Eugenio March. Reviewed patient's history, current symptoms and culture result. No antibiotic treatment recommended since none of the renal stones are obstructive and the patient has no urinary symptoms. She should follow up with her PCP to recheck her urine. Would be a good idea to establish care with urologist Dr. Oneill so when any future problems arise she has a relationship with a urologist that she can call, this appointment is not " urgent.       Coby Gil RN  [facility]  Emergency Dept Lab Result RN  Ph# [phone]

## 2022-03-27 NOTE — TELEPHONE ENCOUNTER
RN Recommendations/Instructions per [place] ED provider  11:49AM: Patient notified of ED provider's treatment recommendations as noted below.  The patient agrees with the plan. She will contact her PCP clinic tomorrow to get an ED follow up visit and her urine rechecked. She will discuss with her PCP (through [facility]) who they would recommend that she sees for urology.  The patient is comfortable with the information given and has no further questions.     Please Contact your PCP clinic or return to the Emergency department if your:    Symptoms return.    Symptoms worsen or other concerning symptom's.    PCP follow-up Questions asked: YES       Coby Gil RN  [facility]  Emergency Dept Lab Result RN  Ph# [phone]

## 2022-11-21 ENCOUNTER — HEALTH MAINTENANCE LETTER (OUTPATIENT)
Age: 47
End: 2022-11-21

## 2022-12-09 NOTE — TELEPHONE ENCOUNTER
Requested Prescriptions   Pending Prescriptions Disp Refills     LORazepam (ATIVAN) 0.5 MG tablet [Pharmacy Med Name: LORAZEPAM 0.5MG TABLETS] 20 tablet 0     Sig: TAKE 1 TABLET BY MOUTH DAILY AS NEEDED    There is no refill protocol information for this order        LORazepam (ATIVAN) 0.5 MG tablet      Last Written Prescription Date:  10/30/2018  Last Fill Quantity: 20,   # refills: 0  Last Office Visit: 10/09/2018  Future Office visit:       Routing refill request to provider for review/approval because:  Drug not on the G, P or [facility] refill protocol or controlled substance  Rachelle Barry RN, BSN        POST-OP DIAGNOSIS:  Crohn's colitis, with fistula 09-Dec-2022 07:43:57  Lazaro Ulloa   POST-OP DIAGNOSIS:  Perianal Crohn's disease, with fistula 09-Dec-2022 11:16:00  Lazaro Ulloa

## 2023-04-16 ENCOUNTER — HEALTH MAINTENANCE LETTER (OUTPATIENT)
Age: 48
End: 2023-04-16

## 2023-08-10 ENCOUNTER — APPOINTMENT (OUTPATIENT)
Dept: CT IMAGING | Facility: CLINIC | Age: 48
End: 2023-08-10
Attending: FAMILY MEDICINE
Payer: COMMERCIAL

## 2023-08-10 ENCOUNTER — HOSPITAL ENCOUNTER (EMERGENCY)
Facility: CLINIC | Age: 48
Discharge: HOME | End: 2023-08-10
Attending: FAMILY MEDICINE | Admitting: FAMILY MEDICINE
Payer: COMMERCIAL

## 2023-08-10 VITALS
HEIGHT: 63 IN | DIASTOLIC BLOOD PRESSURE: 78 MMHG | OXYGEN SATURATION: 100 % | TEMPERATURE: 97.7 F | RESPIRATION RATE: 16 BRPM | HEART RATE: 59 BPM | WEIGHT: 165 LBS | SYSTOLIC BLOOD PRESSURE: 112 MMHG | BODY MASS INDEX: 29.23 KG/M2

## 2023-08-10 DIAGNOSIS — N13.2 HYDRONEPHROSIS WITH URINARY OBSTRUCTION DUE TO URETERAL CALCULUS: ICD-10-CM

## 2023-08-10 LAB
ALBUMIN SERPL BCG-MCNC: 4.3 G/DL (ref 3.5–5.2)
ALBUMIN UR-MCNC: NEGATIVE MG/DL
ALP SERPL-CCNC: 79 U/L (ref 35–104)
ALT SERPL W P-5'-P-CCNC: 24 U/L (ref 0–50)
AMORPH CRY #/AREA URNS HPF: ABNORMAL /HPF
ANION GAP SERPL CALCULATED.3IONS-SCNC: 11 MMOL/L (ref 7–15)
APPEARANCE UR: ABNORMAL
AST SERPL W P-5'-P-CCNC: 15 U/L (ref 0–45)
BACTERIA #/AREA URNS HPF: ABNORMAL /HPF
BASOPHILS # BLD AUTO: 0 10E3/UL (ref 0–0.2)
BASOPHILS NFR BLD AUTO: 0 %
BILIRUB SERPL-MCNC: 0.2 MG/DL
BILIRUB UR QL STRIP: NEGATIVE
BUN SERPL-MCNC: 13.7 MG/DL (ref 6–20)
CALCIUM SERPL-MCNC: 9 MG/DL (ref 8.6–10)
CHLORIDE SERPL-SCNC: 104 MMOL/L (ref 98–107)
COLOR UR AUTO: YELLOW
CREAT SERPL-MCNC: 1.17 MG/DL (ref 0.51–0.95)
DEPRECATED HCO3 PLAS-SCNC: 22 MMOL/L (ref 22–29)
EOSINOPHIL # BLD AUTO: 0.2 10E3/UL (ref 0–0.7)
EOSINOPHIL NFR BLD AUTO: 3 %
ERYTHROCYTE [DISTWIDTH] IN BLOOD BY AUTOMATED COUNT: 12 % (ref 10–15)
GFR SERPL CREATININE-BSD FRML MDRD: 58 ML/MIN/1.73M2
GLUCOSE SERPL-MCNC: 150 MG/DL (ref 70–99)
GLUCOSE UR STRIP-MCNC: NEGATIVE MG/DL
HCG INTACT+B SERPL-ACNC: <1 MIU/ML
HCT VFR BLD AUTO: 34.1 % (ref 35–47)
HGB BLD-MCNC: 11.7 G/DL (ref 11.7–15.7)
HGB UR QL STRIP: ABNORMAL
HYALINE CASTS: 1 /LPF
IMM GRANULOCYTES # BLD: 0 10E3/UL
IMM GRANULOCYTES NFR BLD: 0 %
KETONES UR STRIP-MCNC: NEGATIVE MG/DL
LEUKOCYTE ESTERASE UR QL STRIP: ABNORMAL
LYMPHOCYTES # BLD AUTO: 1.5 10E3/UL (ref 0.8–5.3)
LYMPHOCYTES NFR BLD AUTO: 21 %
MCH RBC QN AUTO: 32.7 PG (ref 26.5–33)
MCHC RBC AUTO-ENTMCNC: 34.3 G/DL (ref 31.5–36.5)
MCV RBC AUTO: 95 FL (ref 78–100)
MONOCYTES # BLD AUTO: 0.4 10E3/UL (ref 0–1.3)
MONOCYTES NFR BLD AUTO: 5 %
MUCOUS THREADS #/AREA URNS LPF: PRESENT /LPF
NEUTROPHILS # BLD AUTO: 5.3 10E3/UL (ref 1.6–8.3)
NEUTROPHILS NFR BLD AUTO: 71 %
NITRATE UR QL: NEGATIVE
NRBC # BLD AUTO: 0 10E3/UL
NRBC BLD AUTO-RTO: 0 /100
PH UR STRIP: 6 [PH] (ref 5–7)
PLATELET # BLD AUTO: 227 10E3/UL (ref 150–450)
POTASSIUM SERPL-SCNC: 3.5 MMOL/L (ref 3.4–5.3)
PROT SERPL-MCNC: 6.9 G/DL (ref 6.4–8.3)
RBC # BLD AUTO: 3.58 10E6/UL (ref 3.8–5.2)
RBC URINE: 61 /HPF
SODIUM SERPL-SCNC: 137 MMOL/L (ref 136–145)
SP GR UR STRIP: 1.01 (ref 1–1.03)
SQUAMOUS EPITHELIAL: 1 /HPF
UROBILINOGEN UR STRIP-MCNC: NORMAL MG/DL
WBC # BLD AUTO: 7.5 10E3/UL (ref 4–11)
WBC CLUMPS #/AREA URNS HPF: PRESENT /HPF
WBC URINE: 7 /HPF

## 2023-08-10 PROCEDURE — 84702 CHORIONIC GONADOTROPIN TEST: CPT | Performed by: FAMILY MEDICINE

## 2023-08-10 PROCEDURE — 258N000003 HC RX IP 258 OP 636: Performed by: FAMILY MEDICINE

## 2023-08-10 PROCEDURE — 74176 CT ABD & PELVIS W/O CONTRAST: CPT

## 2023-08-10 PROCEDURE — 81001 URINALYSIS AUTO W/SCOPE: CPT | Performed by: FAMILY MEDICINE

## 2023-08-10 PROCEDURE — 250N000013 HC RX MED GY IP 250 OP 250 PS 637: Performed by: FAMILY MEDICINE

## 2023-08-10 PROCEDURE — 36415 COLL VENOUS BLD VENIPUNCTURE: CPT | Performed by: FAMILY MEDICINE

## 2023-08-10 PROCEDURE — 96375 TX/PRO/DX INJ NEW DRUG ADDON: CPT

## 2023-08-10 PROCEDURE — 96376 TX/PRO/DX INJ SAME DRUG ADON: CPT

## 2023-08-10 PROCEDURE — 85004 AUTOMATED DIFF WBC COUNT: CPT | Performed by: FAMILY MEDICINE

## 2023-08-10 PROCEDURE — 96374 THER/PROPH/DIAG INJ IV PUSH: CPT

## 2023-08-10 PROCEDURE — 96361 HYDRATE IV INFUSION ADD-ON: CPT

## 2023-08-10 PROCEDURE — 80053 COMPREHEN METABOLIC PANEL: CPT | Performed by: FAMILY MEDICINE

## 2023-08-10 PROCEDURE — 250N000011 HC RX IP 250 OP 636: Performed by: FAMILY MEDICINE

## 2023-08-10 PROCEDURE — 99285 EMERGENCY DEPT VISIT HI MDM: CPT | Mod: 25

## 2023-08-10 PROCEDURE — 99285 EMERGENCY DEPT VISIT HI MDM: CPT | Performed by: FAMILY MEDICINE

## 2023-08-10 RX ORDER — OXYCODONE HYDROCHLORIDE 5 MG/1
5-10 TABLET ORAL EVERY 6 HOURS PRN
Qty: 16 TABLET | Refills: 0 | Status: SHIPPED | OUTPATIENT
Start: 2023-08-10

## 2023-08-10 RX ORDER — LORAZEPAM 2 MG/ML
0.5 INJECTION INTRAMUSCULAR ONCE
Status: COMPLETED | OUTPATIENT
Start: 2023-08-10 | End: 2023-08-10

## 2023-08-10 RX ORDER — TAMSULOSIN HYDROCHLORIDE 0.4 MG/1
0.4 CAPSULE ORAL DAILY
Qty: 15 CAPSULE | Refills: 0 | Status: SHIPPED | OUTPATIENT
Start: 2023-08-10

## 2023-08-10 RX ORDER — ONDANSETRON 2 MG/ML
4 INJECTION INTRAMUSCULAR; INTRAVENOUS EVERY 30 MIN PRN
Status: DISCONTINUED | OUTPATIENT
Start: 2023-08-10 | End: 2023-08-10 | Stop reason: HOSPADM

## 2023-08-10 RX ORDER — ONDANSETRON 4 MG/1
4 TABLET, ORALLY DISINTEGRATING ORAL EVERY 6 HOURS PRN
Qty: 12 TABLET | Refills: 0 | Status: SHIPPED | OUTPATIENT
Start: 2023-08-10

## 2023-08-10 RX ORDER — OXYCODONE HYDROCHLORIDE 5 MG/1
10 TABLET ORAL ONCE
Status: COMPLETED | OUTPATIENT
Start: 2023-08-10 | End: 2023-08-10

## 2023-08-10 RX ORDER — ONDANSETRON 2 MG/ML
4 INJECTION INTRAMUSCULAR; INTRAVENOUS ONCE
Status: COMPLETED | OUTPATIENT
Start: 2023-08-10 | End: 2023-08-10

## 2023-08-10 RX ORDER — KETOROLAC TROMETHAMINE 30 MG/ML
30 INJECTION, SOLUTION INTRAMUSCULAR; INTRAVENOUS ONCE
Status: COMPLETED | OUTPATIENT
Start: 2023-08-10 | End: 2023-08-10

## 2023-08-10 RX ORDER — HYDROMORPHONE HYDROCHLORIDE 1 MG/ML
0.5 INJECTION, SOLUTION INTRAMUSCULAR; INTRAVENOUS; SUBCUTANEOUS EVERY 30 MIN PRN
Status: COMPLETED | OUTPATIENT
Start: 2023-08-10 | End: 2023-08-10

## 2023-08-10 RX ORDER — TAMSULOSIN HYDROCHLORIDE 0.4 MG/1
0.4 CAPSULE ORAL ONCE
Status: COMPLETED | OUTPATIENT
Start: 2023-08-10 | End: 2023-08-10

## 2023-08-10 RX ADMIN — LORAZEPAM 0.5 MG: 2 INJECTION INTRAMUSCULAR; INTRAVENOUS at 03:50

## 2023-08-10 RX ADMIN — SODIUM CHLORIDE 1000 ML: 9 INJECTION, SOLUTION INTRAVENOUS at 01:57

## 2023-08-10 RX ADMIN — HYDROMORPHONE HYDROCHLORIDE 0.5 MG: 1 INJECTION, SOLUTION INTRAMUSCULAR; INTRAVENOUS; SUBCUTANEOUS at 02:20

## 2023-08-10 RX ADMIN — HYDROMORPHONE HYDROCHLORIDE 0.5 MG: 1 INJECTION, SOLUTION INTRAMUSCULAR; INTRAVENOUS; SUBCUTANEOUS at 06:34

## 2023-08-10 RX ADMIN — HYDROMORPHONE HYDROCHLORIDE 0.5 MG: 1 INJECTION, SOLUTION INTRAMUSCULAR; INTRAVENOUS; SUBCUTANEOUS at 03:50

## 2023-08-10 RX ADMIN — KETOROLAC TROMETHAMINE 30 MG: 30 INJECTION, SOLUTION INTRAMUSCULAR; INTRAVENOUS at 01:57

## 2023-08-10 RX ADMIN — TAMSULOSIN HYDROCHLORIDE 0.4 MG: 0.4 CAPSULE ORAL at 05:58

## 2023-08-10 RX ADMIN — OXYCODONE HYDROCHLORIDE 10 MG: 5 TABLET ORAL at 05:58

## 2023-08-10 RX ADMIN — ONDANSETRON 4 MG: 2 INJECTION INTRAMUSCULAR; INTRAVENOUS at 01:15

## 2023-08-10 ASSESSMENT — ACTIVITIES OF DAILY LIVING (ADL)
ADLS_ACUITY_SCORE: 35

## 2023-08-10 NOTE — DISCHARGE INSTRUCTIONS
Encourage fluids.  Strain your urine.  If you catch a stone, bring it to your next urology appointment.  You may use the oxycodone as needed for severe pain.  Flomax 0.4 mg daily.  It sounds like you have some at home.  I wrote another prescription that you can fill if needed.  Zofran ODT as needed for nausea.    Call your urologist in the morning for further instructions.  Please return to the ED if you develop a fever of 100.4, persistent vomiting, pain uncontrolled by oral medications or any concerns.  It was nice visiting with both of you this morning.  I hope you are able to pass this stone quickly.    Thank you for choosing [facility]. We appreciate the opportunity to meet your urgent medical needs. Please let us know if we could have done anything to make your stay more satisfying.    After discharge, please closely monitor for any new or worsening symptoms. Return to the Emergency Department if you develop any acute worsening signs or symptoms.    If you received an opiate pain medication or sedative during your visit, please do not drive for at least 8 hours.     If you had lab work, cultures or imaging studies done during your stay, the final results may still be pending. We will call you if your plan of care needs to change. However, if you are not improving as expected, please follow up with your primary care provider or clinic.     Start any prescription medications that were prescribed to you and take them as directed.     Please see additional handouts that may be pertinent to your condition.

## 2023-08-10 NOTE — ED TRIAGE NOTES
Pt comes in today along with her  c/o lower left abdominal pain. Onset this evening. Hx of IBS and kidney stones.      Triage Assessment       Row Name 08/10/23 0057       Triage Assessment (Adult)    Airway WDL WDL       Respiratory WDL    Respiratory WDL WDL       Skin Circulation/Temperature WDL    Skin Circulation/Temperature WDL WDL       Cardiac WDL    Cardiac WDL WDL       Peripheral/Neurovascular WDL    Peripheral Neurovascular WDL WDL       Cognitive/Neuro/Behavioral WDL    Cognitive/Neuro/Behavioral WDL WDL

## 2023-08-10 NOTE — ED PROVIDER NOTES
History     Chief Complaint   Patient presents with    Abdominal Pain     HPI  Deo Hargrove is a 47 year old female who presents to the ED tonight with acute onset of left CVA/flank pain that began at around 9:15 PM.  She has had some nausea but no vomiting.  Is able to drink however.  Feels like previous kidney stones.  She has had to have stents and lithotripsy before, last was about a year ago down at [facility].  No fevers.  Also has IBS.  Took an oxycodone at home that she had leftover from previous stones but no relief.    Allergies:  Allergies   Allergen Reactions    Penicillins     Triptans [Triptans]      Tightness of the throat       Problem List:    Patient Active Problem List    Diagnosis Date Noted    GBS bacteriuria 2022     Priority: Medium     Final urine culture on 3/25/22 shows the presence of bacteria(s): 10,000 to 50,000 colonies/mL Streptococcus agalactiae B and the patient was not treated with antibiotic's.      Insomnia 2014     Priority: Medium    Generalized anxiety disorder 12/10/2012     Priority: Medium     Diagnosis updated by automated process. Provider to review and confirm.      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     Priority: Medium    Migraine headache 2012     Priority: Medium        Past Medical History:    Past Medical History:   Diagnosis Date    Anxiety     Headaches, migraine     Kidney stone        Past Surgical History:    Past Surgical History:   Procedure Laterality Date     SECTION      GYN SURGERY      had LT tube removed       Family History:    Family History   Problem Relation Age of Onset    Hypertension Mother     Lipids Mother     Alcohol/Drug Father     Breast Cancer Maternal Grandmother     Breast Cancer Paternal Grandmother        Social History:  Marital Status:   [2]  Social History     Tobacco Use    Smoking status: Never    Smokeless tobacco: Never   Substance Use Topics    Alcohol use: Yes     Comment:  "occasional    Drug use: No        Medications:    ondansetron (ZOFRAN ODT) 4 MG ODT tab  oxyCODONE (ROXICODONE) 5 MG tablet  tamsulosin (FLOMAX) 0.4 MG capsule  baclofen (LIORESAL) 10 MG tablet  busPIRone (BUSPAR) 10 MG tablet  busPIRone HCl (BUSPAR) 30 MG tablet  carBAMazepine (CARBATROL) 200 MG 12 hr capsule  hydrOXYzine (ATARAX) 25 MG tablet  hyoscyamine SL (LEVSIN/SL) 0.125 MG sublingual tablet  LORazepam (ATIVAN) 0.5 MG tablet  LORazepam (ATIVAN) 0.5 MG tablet  magnesium oxide (MAG-OX) 400 (241.3 Mg) MG tablet  propranolol (INDERAL) 20 MG tablet  topiramate (TOPAMAX) 100 MG tablet  traMADol (ULTRAM) 50 MG tablet  traZODone (DESYREL) 50 MG tablet          Review of Systems   All other systems reviewed and are negative.      Physical Exam   BP: 99/62  Pulse: 71  Temp: 97.7  F (36.5  C)  Resp: 16  Height: 160 cm (5' 3\")  Weight: 74.8 kg (165 lb)  SpO2: 99 %      Physical Exam  Constitutional:       General: She is in acute distress (mod).      Appearance: She is well-developed.   Cardiovascular:      Rate and Rhythm: Normal rate and regular rhythm.   Pulmonary:      Effort: Pulmonary effort is normal.      Breath sounds: Normal breath sounds.   Abdominal:      Tenderness: There is abdominal tenderness in the left lower quadrant. There is left CVA tenderness.   Skin:     Coloration: Skin is pale.   Neurological:      General: No focal deficit present.      Mental Status: She is alert and oriented to person, place, and time.         ED Course                 Procedures              Critical Care time:  none               Results for orders placed or performed during the hospital encounter of 08/10/23 (from the past 24 hour(s))   CBC with Platelets & Differential    Narrative    The following orders were created for panel order CBC with Platelets & Differential.  Procedure                               Abnormality         Status                     ---------                               -----------         ------    "                  CBC with platelets and d...[[id number]]  Abnormal            Final result                 Please view results for these tests on the individual orders.   Comprehensive metabolic panel   Result Value Ref Range    Sodium 137 136 - 145 mmol/L    Potassium 3.5 3.4 - 5.3 mmol/L    Chloride 104 98 - 107 mmol/L    Carbon Dioxide (CO2) 22 22 - 29 mmol/L    Anion Gap 11 7 - 15 mmol/L    Urea Nitrogen 13.7 6.0 - 20.0 mg/dL    Creatinine 1.17 (H) 0.51 - 0.95 mg/dL    Calcium 9.0 8.6 - 10.0 mg/dL    Glucose 150 (H) 70 - 99 mg/dL    Alkaline Phosphatase 79 35 - 104 U/L    AST 15 0 - 45 U/L    ALT 24 0 - 50 U/L    Protein Total 6.9 6.4 - 8.3 g/dL    Albumin 4.3 3.5 - 5.2 g/dL    Bilirubin Total 0.2 <=1.2 mg/dL    GFR Estimate 58 (L) >60 mL/min/1.73m2   CBC with platelets and differential   Result Value Ref Range    WBC Count 7.5 4.0 - 11.0 10e3/uL    RBC Count 3.58 (L) 3.80 - 5.20 10e6/uL    Hemoglobin 11.7 11.7 - 15.7 g/dL    Hematocrit 34.1 (L) 35.0 - 47.0 %    MCV 95 78 - 100 fL    MCH 32.7 26.5 - 33.0 pg    MCHC 34.3 31.5 - 36.5 g/dL    RDW 12.0 10.0 - 15.0 %    Platelet Count 227 150 - 450 10e3/uL    % Neutrophils 71 %    % Lymphocytes 21 %    % Monocytes 5 %    % Eosinophils 3 %    % Basophils 0 %    % Immature Granulocytes 0 %    NRBCs per 100 WBC 0 <1 /100    Absolute Neutrophils 5.3 1.6 - 8.3 10e3/uL    Absolute Lymphocytes 1.5 0.8 - 5.3 10e3/uL    Absolute Monocytes 0.4 0.0 - 1.3 10e3/uL    Absolute Eosinophils 0.2 0.0 - 0.7 10e3/uL    Absolute Basophils 0.0 0.0 - 0.2 10e3/uL    Absolute Immature Granulocytes 0.0 <=0.4 10e3/uL    Absolute NRBCs 0.0 10e3/uL   HCG quantitative pregnancy   Result Value Ref Range    hCG Quantitative <1 <5 mIU/mL   UA with Microscopic reflex to Culture    Specimen: Urine, NOS   Result Value Ref Range    Color Urine Yellow Colorless, Straw, Light Yellow, Yellow    Appearance Urine Cloudy (A) Clear    Glucose Urine Negative Negative mg/dL    Bilirubin Urine Negative Negative     Ketones Urine Negative Negative mg/dL    Specific Gravity Urine 1.014 1.003 - 1.035    Blood Urine Moderate (A) Negative    pH Urine 6.0 5.0 - 7.0    Protein Albumin Urine Negative Negative mg/dL    Urobilinogen Urine Normal Normal, 2.0 mg/dL    Nitrite Urine Negative Negative    Leukocyte Esterase Urine Trace (A) Negative    Bacteria Urine Few (A) None Seen /HPF    WBC Clumps Urine Present (A) None Seen /HPF    Mucus Urine Present (A) None Seen /LPF    Amorphous Crystals Urine Few (A) None Seen /HPF    RBC Urine 61 (H) <=2 /HPF    WBC Urine 7 (H) <=5 /HPF    Squamous Epithelials Urine 1 <=1 /HPF    Hyaline Casts Urine 1 <=2 /LPF    Narrative    Urine Culture not indicated   CT Abdomen Pelvis w/o Contrast    Narrative    EXAM: CT ABDOMEN PELVIS W/O CONTRAST  LOCATION: [facility]  DATE: 8/10/2023    INDICATION: Acute onset of left flank pain.  History of renal calculi.  COMPARISON: 03/23/2022.  TECHNIQUE: CT scan of the abdomen and pelvis was performed without IV contrast. Multiplanar reformats were obtained. Dose reduction techniques were used.  CONTRAST: None.    FINDINGS:   LOWER CHEST: Normal.    HEPATOBILIARY: Small benign lipoma at the dome of the liver is unchanged. Liver is otherwise negative. No calcified gallstones or biliary dilatation.    PANCREAS: Normal.    SPLEEN: Normal.    ADRENAL GLANDS: Normal.    KIDNEYS/BLADDER: There are 5 nonobstructing stones in the right kidney with the largest measuring up to 4 mm. Mild cortical scarring in the right kidney. No right-sided ureteral stones or hydronephrosis. There are 7 nonobstructing stones in the left   kidney with the largest measuring 3 mm. Mild-to-moderate left hydronephrosis is present and secondary to an obstructing 4 mm stone in the distal left ureter just above the UVJ. There may be a second additional tiny 2 mm stone in the distal left UVJ as   well. Bladder is unremarkable.    BOWEL: Normal.    LYMPH NODES:  Normal.    VASCULATURE: Unremarkable.    PELVIC ORGANS: IUD within the uterus. No pelvic masses.    MUSCULOSKELETAL: Normal.      Impression    IMPRESSION:   1.  4 mm obstructing stone distal left ureter just above the UVJ resulting in mild-to-moderate left hydronephrosis. There may be an additional 2 mm stone present within the left UVJ as well.    2.  Multiple nonobstructing intrarenal calculi bilaterally as detailed above.    3.  IUD within the uterus. No significant findings elsewhere.       Medications   ondansetron (ZOFRAN) injection 4 mg (has no administration in time range)   ondansetron (ZOFRAN) injection 4 mg (4 mg Intravenous $Given 8/10/23 0115)   ketorolac (TORADOL) injection 30 mg (30 mg Intravenous $Given 8/10/23 0157)   0.9% sodium chloride BOLUS (0 mLs Intravenous Stopped 8/10/23 0639)   HYDROmorphone (PF) (DILAUDID) injection 0.5 mg (0.5 mg Intravenous $Given 8/10/23 0634)   LORazepam (ATIVAN) injection 0.5 mg (0.5 mg Intravenous $Given 8/10/23 0350)   tamsulosin (FLOMAX) capsule 0.4 mg (0.4 mg Oral $Given 8/10/23 0558)   oxyCODONE (ROXICODONE) tablet 10 mg (10 mg Oral $Given 8/10/23 0558)       Assessments & Plan (with Medical Decision Making)  47-year-old sudden onset of left flank pain at 9:15 PM.  Has a history of stones requiring lithotripsy and stenting.  Some nausea but no vomiting.  IV placed.  Zofran and Toradol.  CT to evaluate for left ureteral calculi.  White count normal.  Renal function is down slightly with a GFR of 58, creatinine 1.17, BUN 13.7.  Partial relief with the IV Toradol.  She did require some IV Dilaudid to get on top of her pain.  CT shows a 4 mm obstructing left UVJ stone with mild to moderate left hydro nephritis.  Might be an additional 2 mm stone at the left UVJ as well.  She has multiple nonobstructing bilateral intrarenal calculi.  UA shows moderate blood, 61 red cells, 7 white cells, trace leukocyte esterase.  White count is normal.  She would like to try going  home.  Prescription for oxycodone sent along with Zofran.  She has some Flomax at home but I wrote her a prescription that she can fill if need be.  She was given the first dose here in the ED.  She is going to contact her urologist in the morning to make further plans and strain her urine as she has never been able to catch one of her stones.  We discussed reportable signs when to return.  Verbal and written discharge instructions given.  She and her  are comfortable with this plan.       I have reviewed the nursing notes.    I have reviewed the findings, diagnosis, plan and need for follow up with the patient.           Medical Decision Making  The patient's presentation was of moderate complexity (an undiagnosed new problem with uncertain diagnosis).    The patient's evaluation involved:  ordering and/or review of 3+ test(s) in this encounter (see separate area of note for details)    The patient's management necessitated high risk (a parenteral controlled substance).        Discharge Medication List as of 8/10/2023  6:40 AM        START taking these medications    Details   ondansetron (ZOFRAN ODT) 4 MG ODT tab Take 1 tablet (4 mg) by mouth every 6 hours as needed for nausea, Disp-12 tablet, R-0, E-Prescribe      oxyCODONE (ROXICODONE) 5 MG tablet Take 1-2 tablets (5-10 mg) by mouth every 6 hours as needed for severe pain, Disp-16 tablet, R-0, E-Prescribe      tamsulosin (FLOMAX) 0.4 MG capsule Take 1 capsule (0.4 mg) by mouth daily, Disp-15 capsule, R-0, Local Print             Final diagnoses:   Hydronephrosis with urinary obstruction due to ureteral calculus - 4mm at UVJ       8/10/2023   [facility] EMERGENCY DEPT       Anthony Nava MD  08/10/23 0726

## 2023-09-17 ENCOUNTER — HEALTH MAINTENANCE LETTER (OUTPATIENT)
Age: 48
End: 2023-09-17

## 2024-06-23 ENCOUNTER — HEALTH MAINTENANCE LETTER (OUTPATIENT)
Age: 49
End: 2024-06-23

## 2024-10-02 ENCOUNTER — TRANSCRIBE ORDERS (OUTPATIENT)
Dept: OTHER | Age: 49
End: 2024-10-02

## 2024-10-02 DIAGNOSIS — K58.1 IRRITABLE BOWEL SYNDROME WITH CONSTIPATION: Primary | ICD-10-CM
